# Patient Record
Sex: FEMALE | Race: BLACK OR AFRICAN AMERICAN | NOT HISPANIC OR LATINO | Employment: FULL TIME | RURAL
[De-identification: names, ages, dates, MRNs, and addresses within clinical notes are randomized per-mention and may not be internally consistent; named-entity substitution may affect disease eponyms.]

---

## 2023-06-19 DIAGNOSIS — Z01.818 PREOP TESTING: Primary | ICD-10-CM

## 2023-06-21 ENCOUNTER — HOSPITAL ENCOUNTER (OUTPATIENT)
Facility: HOSPITAL | Age: 58
Discharge: HOME | End: 2023-06-21
Attending: OBSTETRICS & GYNECOLOGY | Admitting: OBSTETRICS & GYNECOLOGY
Payer: COMMERCIAL

## 2023-06-21 ENCOUNTER — ANESTHESIA EVENT (OUTPATIENT)
Dept: SURGERY | Facility: HOSPITAL | Age: 58
End: 2023-06-21
Payer: COMMERCIAL

## 2023-06-21 ENCOUNTER — ANESTHESIA (OUTPATIENT)
Dept: SURGERY | Facility: HOSPITAL | Age: 58
End: 2023-06-21
Payer: COMMERCIAL

## 2023-06-21 VITALS
BODY MASS INDEX: 28.7 KG/M2 | OXYGEN SATURATION: 97 % | HEIGHT: 63 IN | RESPIRATION RATE: 18 BRPM | SYSTOLIC BLOOD PRESSURE: 105 MMHG | WEIGHT: 162 LBS | TEMPERATURE: 98 F | DIASTOLIC BLOOD PRESSURE: 90 MMHG | HEART RATE: 64 BPM

## 2023-06-21 DIAGNOSIS — N87.0 MILD DYSPLASIA OF CERVIX: ICD-10-CM

## 2023-06-21 DIAGNOSIS — R87.810 PAP SMEAR OF CERVIX SHOWS HIGH RISK HPV PRESENT: ICD-10-CM

## 2023-06-21 DIAGNOSIS — Z98.890 STATUS POST CONIZATION OF CERVIX: Primary | ICD-10-CM

## 2023-06-21 PROBLEM — N87.9 CERVICAL DYSPLASIA: Status: ACTIVE | Noted: 2023-06-21

## 2023-06-21 PROCEDURE — 36000706: Performed by: OBSTETRICS & GYNECOLOGY

## 2023-06-21 PROCEDURE — D9220A PRA ANESTHESIA: ICD-10-PCS | Mod: ANES,,, | Performed by: ANESTHESIOLOGY

## 2023-06-21 PROCEDURE — D9220A PRA ANESTHESIA: Mod: CRNA,,, | Performed by: NURSE ANESTHETIST, CERTIFIED REGISTERED

## 2023-06-21 PROCEDURE — 88307 SURGICAL PATHOLOGY: ICD-10-PCS | Mod: 26,,, | Performed by: PATHOLOGY

## 2023-06-21 PROCEDURE — 88307 TISSUE EXAM BY PATHOLOGIST: CPT | Mod: TC,SUR | Performed by: OBSTETRICS & GYNECOLOGY

## 2023-06-21 PROCEDURE — D9220A PRA ANESTHESIA: ICD-10-PCS | Mod: CRNA,,, | Performed by: NURSE ANESTHETIST, CERTIFIED REGISTERED

## 2023-06-21 PROCEDURE — 57520 PR CONIZATION CERVIX,KNIFE/LASER: ICD-10-PCS | Mod: ,,, | Performed by: OBSTETRICS & GYNECOLOGY

## 2023-06-21 PROCEDURE — 88342 SURGICAL PATHOLOGY: ICD-10-PCS | Mod: 26,,, | Performed by: PATHOLOGY

## 2023-06-21 PROCEDURE — 37000008 HC ANESTHESIA 1ST 15 MINUTES: Performed by: OBSTETRICS & GYNECOLOGY

## 2023-06-21 PROCEDURE — 37000009 HC ANESTHESIA EA ADD 15 MINS: Performed by: OBSTETRICS & GYNECOLOGY

## 2023-06-21 PROCEDURE — 88342 IMHCHEM/IMCYTCHM 1ST ANTB: CPT | Mod: 26,,, | Performed by: PATHOLOGY

## 2023-06-21 PROCEDURE — 57520 CONIZATION OF CERVIX: CPT | Mod: ,,, | Performed by: OBSTETRICS & GYNECOLOGY

## 2023-06-21 PROCEDURE — 36000707: Performed by: OBSTETRICS & GYNECOLOGY

## 2023-06-21 PROCEDURE — 71000015 HC POSTOP RECOV 1ST HR: Performed by: OBSTETRICS & GYNECOLOGY

## 2023-06-21 PROCEDURE — 88342 IMHCHEM/IMCYTCHM 1ST ANTB: CPT | Mod: TC,SUR | Performed by: OBSTETRICS & GYNECOLOGY

## 2023-06-21 PROCEDURE — 27000655: Performed by: NURSE ANESTHETIST, CERTIFIED REGISTERED

## 2023-06-21 PROCEDURE — 63600175 PHARM REV CODE 636 W HCPCS: Performed by: NURSE ANESTHETIST, CERTIFIED REGISTERED

## 2023-06-21 PROCEDURE — 71000016 HC POSTOP RECOV ADDL HR: Performed by: OBSTETRICS & GYNECOLOGY

## 2023-06-21 PROCEDURE — 25000003 PHARM REV CODE 250: Performed by: OBSTETRICS & GYNECOLOGY

## 2023-06-21 PROCEDURE — 27000177 HC AIRWAY, LARYNGEAL MASK: Performed by: NURSE ANESTHETIST, CERTIFIED REGISTERED

## 2023-06-21 PROCEDURE — 25000003 PHARM REV CODE 250: Performed by: NURSE ANESTHETIST, CERTIFIED REGISTERED

## 2023-06-21 PROCEDURE — 88307 TISSUE EXAM BY PATHOLOGIST: CPT | Mod: 26,,, | Performed by: PATHOLOGY

## 2023-06-21 PROCEDURE — 71000033 HC RECOVERY, INTIAL HOUR: Performed by: OBSTETRICS & GYNECOLOGY

## 2023-06-21 PROCEDURE — D9220A PRA ANESTHESIA: Mod: ANES,,, | Performed by: ANESTHESIOLOGY

## 2023-06-21 RX ORDER — ONDANSETRON 4 MG/1
8 TABLET, ORALLY DISINTEGRATING ORAL EVERY 8 HOURS PRN
Status: DISCONTINUED | OUTPATIENT
Start: 2023-06-21 | End: 2023-06-21 | Stop reason: HOSPADM

## 2023-06-21 RX ORDER — PROPOFOL 10 MG/ML
VIAL (ML) INTRAVENOUS
Status: DISCONTINUED | OUTPATIENT
Start: 2023-06-21 | End: 2023-06-21

## 2023-06-21 RX ORDER — SODIUM CHLORIDE, SODIUM LACTATE, POTASSIUM CHLORIDE, CALCIUM CHLORIDE 600; 310; 30; 20 MG/100ML; MG/100ML; MG/100ML; MG/100ML
INJECTION, SOLUTION INTRAVENOUS CONTINUOUS PRN
Status: DISCONTINUED | OUTPATIENT
Start: 2023-06-21 | End: 2023-06-21

## 2023-06-21 RX ORDER — HYDROMORPHONE HYDROCHLORIDE 2 MG/ML
0.5 INJECTION, SOLUTION INTRAMUSCULAR; INTRAVENOUS; SUBCUTANEOUS EVERY 5 MIN PRN
Status: DISCONTINUED | OUTPATIENT
Start: 2023-06-21 | End: 2023-06-21 | Stop reason: HOSPADM

## 2023-06-21 RX ORDER — MEPERIDINE HYDROCHLORIDE 25 MG/ML
25 INJECTION INTRAMUSCULAR; INTRAVENOUS; SUBCUTANEOUS ONCE AS NEEDED
Status: DISCONTINUED | OUTPATIENT
Start: 2023-06-21 | End: 2023-06-21 | Stop reason: HOSPADM

## 2023-06-21 RX ORDER — SODIUM CHLORIDE 9 MG/ML
INJECTION, SOLUTION INTRAVENOUS CONTINUOUS
Status: DISCONTINUED | OUTPATIENT
Start: 2023-06-21 | End: 2023-06-21 | Stop reason: HOSPADM

## 2023-06-21 RX ORDER — ONDANSETRON 2 MG/ML
INJECTION INTRAMUSCULAR; INTRAVENOUS
Status: DISCONTINUED | OUTPATIENT
Start: 2023-06-21 | End: 2023-06-21

## 2023-06-21 RX ORDER — DIPHENHYDRAMINE HCL 25 MG
25 CAPSULE ORAL EVERY 4 HOURS PRN
Status: DISCONTINUED | OUTPATIENT
Start: 2023-06-21 | End: 2023-06-21 | Stop reason: HOSPADM

## 2023-06-21 RX ORDER — DIPHENHYDRAMINE HYDROCHLORIDE 50 MG/ML
25 INJECTION INTRAMUSCULAR; INTRAVENOUS EVERY 4 HOURS PRN
Status: DISCONTINUED | OUTPATIENT
Start: 2023-06-21 | End: 2023-06-21 | Stop reason: HOSPADM

## 2023-06-21 RX ORDER — MORPHINE SULFATE 10 MG/ML
4 INJECTION INTRAMUSCULAR; INTRAVENOUS; SUBCUTANEOUS EVERY 5 MIN PRN
Status: DISCONTINUED | OUTPATIENT
Start: 2023-06-21 | End: 2023-06-21 | Stop reason: HOSPADM

## 2023-06-21 RX ORDER — KETOROLAC TROMETHAMINE 30 MG/ML
INJECTION, SOLUTION INTRAMUSCULAR; INTRAVENOUS
Status: DISCONTINUED | OUTPATIENT
Start: 2023-06-21 | End: 2023-06-21

## 2023-06-21 RX ORDER — LIDOCAINE HYDROCHLORIDE 20 MG/ML
INJECTION, SOLUTION EPIDURAL; INFILTRATION; INTRACAUDAL; PERINEURAL
Status: DISCONTINUED | OUTPATIENT
Start: 2023-06-21 | End: 2023-06-21

## 2023-06-21 RX ORDER — FENTANYL CITRATE 50 UG/ML
INJECTION, SOLUTION INTRAMUSCULAR; INTRAVENOUS
Status: DISCONTINUED | OUTPATIENT
Start: 2023-06-21 | End: 2023-06-21

## 2023-06-21 RX ORDER — MIDAZOLAM HYDROCHLORIDE 1 MG/ML
INJECTION INTRAMUSCULAR; INTRAVENOUS
Status: DISCONTINUED | OUTPATIENT
Start: 2023-06-21 | End: 2023-06-21

## 2023-06-21 RX ORDER — HYDROCODONE BITARTRATE AND ACETAMINOPHEN 5; 325 MG/1; MG/1
1 TABLET ORAL EVERY 4 HOURS PRN
Status: DISCONTINUED | OUTPATIENT
Start: 2023-06-21 | End: 2023-06-21 | Stop reason: HOSPADM

## 2023-06-21 RX ORDER — HYDROCODONE BITARTRATE AND ACETAMINOPHEN 7.5; 325 MG/1; MG/1
1 TABLET ORAL EVERY 6 HOURS PRN
Qty: 4 TABLET | Refills: 0 | Status: SHIPPED | OUTPATIENT
Start: 2023-06-21

## 2023-06-21 RX ORDER — SODIUM CHLORIDE, SODIUM LACTATE, POTASSIUM CHLORIDE, CALCIUM CHLORIDE 600; 310; 30; 20 MG/100ML; MG/100ML; MG/100ML; MG/100ML
INJECTION, SOLUTION INTRAVENOUS CONTINUOUS
Status: DISCONTINUED | OUTPATIENT
Start: 2023-06-21 | End: 2023-06-21 | Stop reason: HOSPADM

## 2023-06-21 RX ORDER — DIPHENHYDRAMINE HYDROCHLORIDE 50 MG/ML
25 INJECTION INTRAMUSCULAR; INTRAVENOUS EVERY 6 HOURS PRN
Status: DISCONTINUED | OUTPATIENT
Start: 2023-06-21 | End: 2023-06-21 | Stop reason: HOSPADM

## 2023-06-21 RX ORDER — ONDANSETRON 2 MG/ML
4 INJECTION INTRAMUSCULAR; INTRAVENOUS DAILY PRN
Status: DISCONTINUED | OUTPATIENT
Start: 2023-06-21 | End: 2023-06-21 | Stop reason: HOSPADM

## 2023-06-21 RX ORDER — DEXAMETHASONE SODIUM PHOSPHATE 100 MG/10ML
INJECTION INTRAMUSCULAR; INTRAVENOUS
Status: DISCONTINUED | OUTPATIENT
Start: 2023-06-21 | End: 2023-06-21

## 2023-06-21 RX ORDER — PROCHLORPERAZINE EDISYLATE 5 MG/ML
5 INJECTION INTRAMUSCULAR; INTRAVENOUS EVERY 6 HOURS PRN
Status: DISCONTINUED | OUTPATIENT
Start: 2023-06-21 | End: 2023-06-21 | Stop reason: HOSPADM

## 2023-06-21 RX ORDER — CEFAZOLIN SODIUM 1 G/3ML
INJECTION, POWDER, FOR SOLUTION INTRAMUSCULAR; INTRAVENOUS
Status: DISCONTINUED | OUTPATIENT
Start: 2023-06-21 | End: 2023-06-21

## 2023-06-21 RX ORDER — SODIUM CHLORIDE 9 MG/ML
INJECTION, SOLUTION INTRAVENOUS CONTINUOUS PRN
Status: DISCONTINUED | OUTPATIENT
Start: 2023-06-21 | End: 2023-06-21

## 2023-06-21 RX ADMIN — MIDAZOLAM HYDROCHLORIDE 2 MG: 1 INJECTION, SOLUTION INTRAMUSCULAR; INTRAVENOUS at 01:06

## 2023-06-21 RX ADMIN — CEFAZOLIN 2000 MG: 1 INJECTION, POWDER, FOR SOLUTION INTRAMUSCULAR; INTRAVENOUS; PARENTERAL at 01:06

## 2023-06-21 RX ADMIN — DEXAMETHASONE SODIUM PHOSPHATE 8 MG: 10 INJECTION INTRAMUSCULAR; INTRAVENOUS at 01:06

## 2023-06-21 RX ADMIN — SODIUM CHLORIDE, POTASSIUM CHLORIDE, SODIUM LACTATE AND CALCIUM CHLORIDE: 600; 310; 30; 20 INJECTION, SOLUTION INTRAVENOUS at 01:06

## 2023-06-21 RX ADMIN — LIDOCAINE HYDROCHLORIDE 50 MG: 20 INJECTION, SOLUTION INTRAVENOUS at 01:06

## 2023-06-21 RX ADMIN — FENTANYL CITRATE 100 MCG: 50 INJECTION INTRAMUSCULAR; INTRAVENOUS at 01:06

## 2023-06-21 RX ADMIN — SODIUM CHLORIDE: 9 INJECTION, SOLUTION INTRAVENOUS at 08:06

## 2023-06-21 RX ADMIN — HYDROCODONE BITARTRATE AND ACETAMINOPHEN 1 TABLET: 5; 325 TABLET ORAL at 03:06

## 2023-06-21 RX ADMIN — KETOROLAC TROMETHAMINE 30 MG: 30 INJECTION, SOLUTION INTRAMUSCULAR at 02:06

## 2023-06-21 RX ADMIN — SODIUM CHLORIDE: 9 INJECTION, SOLUTION INTRAVENOUS at 01:06

## 2023-06-21 RX ADMIN — PROPOFOL 200 MG: 10 INJECTION, EMULSION INTRAVENOUS at 01:06

## 2023-06-21 RX ADMIN — ONDANSETRON 4 MG: 2 INJECTION INTRAMUSCULAR; INTRAVENOUS at 01:06

## 2023-06-21 NOTE — H&P
"History & Physical    SUBJECTIVE:     History of Present Illness:  Patient is a 57 y.o. female presents for [facility] secondary to cervical dysplasia  Chief Complaint   Patient presents with    Cervical Dysplasia       Review of patient's allergies indicates:  No Known Allergies    Current Facility-Administered Medications   Medication Dose Route Frequency Provider Last Rate Last Admin    0.9%  NaCl infusion   Intravenous Continuous Urelaine MD Berenice           No past medical history on file.  Past Surgical History:   Procedure Laterality Date     SECTION       No family history on file.        Review of Systems:  Review of Systems   Constitutional:  Negative for appetite change, chills, fatigue and fever.   HENT: Negative.     Eyes: Negative.    Respiratory:  Negative for cough, chest tightness and shortness of breath.    Cardiovascular:  Negative for chest pain, palpitations and leg swelling.   Gastrointestinal:  Negative for abdominal distention, abdominal pain, blood in stool, constipation, diarrhea, nausea and vomiting.   Endocrine: Negative for cold intolerance, heat intolerance, polydipsia, polyphagia and polyuria.   Genitourinary:  Negative for difficulty urinating, dyspareunia, dysuria, flank pain, frequency, pelvic pain, urgency, vaginal bleeding, vaginal discharge and vaginal pain.   Musculoskeletal: Negative.    Skin: Negative.    Neurological: Negative.    Psychiatric/Behavioral: Negative.  Negative for agitation, behavioral problems, confusion and sleep disturbance. The patient is not nervous/anxious.      OBJECTIVE:     Vital Signs (Most Recent)  Temp: 97.9 °F (36.6 °C) (23 08)  Pulse: 61 (23 08)  Resp: 16 (23 08)  BP: 125/78 (23 08)  SpO2: 100 % (23)  5' 3" (1.6 m)  73.5 kg (162 lb)     Physical Exam:  Physical Exam  Vitals reviewed. Exam conducted with a chaperone present.   Constitutional:       Appearance: Normal appearance.   HENT:      Head: " Normocephalic and atraumatic.      Mouth/Throat:      Mouth: Mucous membranes are moist.   Eyes:      Extraocular Movements: Extraocular movements intact.      Pupils: Pupils are equal, round, and reactive to light.   Cardiovascular:      Rate and Rhythm: Normal rate and regular rhythm.      Pulses: Normal pulses.      Heart sounds: Normal heart sounds.   Pulmonary:      Effort: Pulmonary effort is normal.      Breath sounds: Normal breath sounds.   Abdominal:      General: Abdomen is flat. Bowel sounds are normal.      Palpations: Abdomen is soft.   Musculoskeletal:         General: Normal range of motion.      Cervical back: Normal range of motion.   Skin:     General: Skin is warm and dry.   Neurological:      General: No focal deficit present.      Mental Status: She is alert and oriented to person, place, and time.   Psychiatric:         Mood and Affect: Mood normal.         Behavior: Behavior normal.         Thought Content: Thought content normal.         Judgment: Judgment normal.       Laboratory  Lab Visit on 06/19/2023   Component Date Value Ref Range Status    Sodium 06/19/2023 143  136 - 145 mmol/L Final    Potassium 06/19/2023 4.2  3.5 - 5.1 mmol/L Final    Chloride 06/19/2023 113 (H)  98 - 107 mmol/L Final    CO2 06/19/2023 27  21 - 32 mmol/L Final    Anion Gap 06/19/2023 7  7 - 16 mmol/L Final    Glucose 06/19/2023 97  74 - 106 mg/dL Final    BUN 06/19/2023 19 (H)  7 - 18 mg/dL Final    Creatinine 06/19/2023 0.75  0.55 - 1.02 mg/dL Final    BUN/Creatinine Ratio 06/19/2023 25 (H)  6 - 20 Final    Calcium 06/19/2023 9.7  8.5 - 10.1 mg/dL Final    Total Protein 06/19/2023 6.9  6.4 - 8.2 g/dL Final    Albumin 06/19/2023 3.7  3.5 - 5.0 g/dL Final    Globulin 06/19/2023 3.2  2.0 - 4.0 g/dL Final    A/G Ratio 06/19/2023 1.2   Final    Bilirubin, Total 06/19/2023 0.2  >0.0 - 1.2 mg/dL Final    Alk Phos 06/19/2023 85  46 - 118 U/L Final    ALT 06/19/2023 21  13 - 56 U/L Final    AST 06/19/2023 16  15 - 37 U/L  Final    eGFR 06/19/2023 94  >=60 mL/min/1.73m2 Final    Specimen Outdate 06/19/2023 06/22/2023 23:59   Final    Group & Rh 06/19/2023 O POS   Final    Indirect Nav 06/19/2023 POS (A)   Final    WBC 06/19/2023 5.33  4.50 - 11.00 K/uL Final    RBC 06/19/2023 4.23  4.20 - 5.40 M/uL Final    Hemoglobin 06/19/2023 12.6  12.0 - 16.0 g/dL Final    Hematocrit 06/19/2023 40.2  38.0 - 47.0 % Final    MCV 06/19/2023 95.0  80.0 - 96.0 fL Final    MCH 06/19/2023 29.8  27.0 - 31.0 pg Final    MCHC 06/19/2023 31.3 (L)  32.0 - 36.0 g/dL Final    RDW 06/19/2023 12.8  11.5 - 14.5 % Final    Platelet Count 06/19/2023 190  150 - 400 K/uL Final    MPV 06/19/2023 12.0  9.4 - 12.4 fL Final    Neutrophils % 06/19/2023 40.7 (L)  53.0 - 65.0 % Final    Lymphocytes % 06/19/2023 47.7 (H)  27.0 - 41.0 % Final    Monocytes % 06/19/2023 7.5 (H)  2.0 - 6.0 % Final    Eosinophils % 06/19/2023 3.0  1.0 - 4.0 % Final    Basophils % 06/19/2023 0.9  0.0 - 1.0 % Final    Immature Granulocytes % 06/19/2023 0.2  0.0 - 0.4 % Final    nRBC, Auto 06/19/2023 0.0  <=0.0 % Final    Neutrophils, Abs 06/19/2023 2.17  1.80 - 7.70 K/uL Final    Lymphocytes, Absolute 06/19/2023 2.54  1.00 - 4.80 K/uL Final    Monocytes, Absolute 06/19/2023 0.40  0.00 - 0.80 K/uL Final    Eosinophils, Absolute 06/19/2023 0.16  0.00 - 0.50 K/uL Final    Basophils, Absolute 06/19/2023 0.05  0.00 - 0.20 K/uL Final    Immature Granulocytes, Absolute 06/19/2023 0.01  0.00 - 0.04 K/uL Final    nRBC, Absolute 06/19/2023 0.00  <=0.00 x10e3/uL Final    Diff Type 06/19/2023 Auto   Final    Antibody Identification 06/19/2023 POS, Anti-E   Final   Lab Visit on 06/19/2023   Component Date Value Ref Range Status    HCG Qualitative, Urine 06/19/2023 Negative  Negative Final           ASSESSMENT/PLAN:   [email or web address]@  Active Diagnoses:    Diagnosis Date Noted POA    Cervical dysplasia [N87.9] 06/21/2023 Unknown      Problems Resolved During this Admission:       PLAN:Plan   Pt scheduled for [facility]  at 6/21/2023  Risks, benefits and alternatives to the procedure reviewed with the pt  After discussion of the risk, alternatives, benefits, and indication of surgery, as well as the risk of surgery, questions were sought and answered and informed consent obtained to proceed with surgery. We discussed the risk of the procedures to include, but not be limited to, 1) bleeding, which could be possibly excessive, potentially requiring a blood transfusion and subsequent risk of hepatitis (1 in 300,000), transfusion reaction (<1%), and HIV (1 in 1,000,000); 2) injury to internal organs including the bowel, bladder, great vessels, nerves, and ureters, potentially requiring further surgery at that time or at a later date; 3) infection requiring a prolonged hospital stay and antibiotics with possible drainage of an abscess or wound breakdown; 4) anesthetic complications; 5) deep venous thrombosis and the risk of pulmonary emboli; 6) pneumonia; and 7) infertility and/or menopause, 8) possible death. All questions were answered and a consent form was signed. She stated she understood the above risks and desired to proceed.   All questions answered to the level of the patient's satisfaction.   Case request and orders done.   Written materials provided  Consent obtained.
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
## 2023-06-21 NOTE — OP NOTE
DATE OF PROCEDURE: 06/21/2023    PREOPERATIVE DIAGNOSES:   1. Cervical dysplasia         POSTOPERATIVE DIAGNOSES:   1. same      PROCEDURE: [facility]    SURGEON: Rosette Ng MD    ASSISTANT: none    ANESTHESIA: general    ESTIMATED BLOOD LOSS: 25 mL.     COMPLICATIONS: None.     FINDINGS: pathology pending    PROCEDURE IN DETAIL:   Following informed consent the patient was taken to the operating room where general anesthesia was administered difficulty. She is prepped and draped in usual sterile fashion placed in dorsal lithotomy position. A weighted speculum was placed into the vagina and the anterior lip of cervix grasped with single-tooth tenaculum. Stay sutures were placed at the 3 and 9:00 position with 2-0 chromic. The cervix was then painted with Lugol solution dysplastic area identified. Pitressin was then injected circumferentially as the patient tolerated well. Using a right angle scapel the dysplastic area was excised as a conelike fashion. The specimen was tagged at 12:00 position for pathological purposes. The cervical bed was then cauterized with the Bovie with hemostasis found be adequate. Using a 2-0 chromic running interlocking suture was placed cervix for added support. After which Monsel solution was applied for the last bit of hemostasis. At this procedure all sponge lap needle counts correct ×2. Patient taken cover awake and stable condition.
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
## 2023-06-21 NOTE — ANESTHESIA PROCEDURE NOTES
Intubation    Date/Time: 6/21/2023 1:21 PM  Performed by: Baudilio Jeffries CRNA  Authorized by: Kristian Davison DO     Intubation:     Induction:  Intravenous    Intubated:  Postinduction    Mask Ventilation:  Easy mask    Attempts:  1    Attempted By:  CRNA    Difficult Airway Encountered?: No      Complications:  None    Airway Device:  Supraglottic airway/LMA    Airway Device Size:  4.0    Placement Verified By:  Capnometry    Complicating Factors:  None    Findings Post-Intubation:  BS equal bilateral

## 2023-06-21 NOTE — ANESTHESIA PREPROCEDURE EVALUATION
06/21/2023  Laura Reynoso is a 57 y.o., female.      Pre-op Assessment    I have reviewed the Patient Summary Reports.     I have reviewed the Nursing Notes. I have reviewed the NPO Status.   I have reviewed the Medications.     Review of Systems  Anesthesia Hx:  No problems with previous Anesthesia  Denies Family Hx of Anesthesia complications.   Denies Personal Hx of Anesthesia complications.   Social:  Non-Smoker, No Alcohol Use    Cardiovascular:   Exercise tolerance: good    Hepatic/GI:   GERD, well controlled    OB/GYN/PEDS:  Cervical dysplasia       Physical Exam  General: Well nourished, Cooperative and Alert    Airway:  Mallampati: II   Mouth Opening: Normal  TM Distance: Normal  Tongue: Normal  Neck ROM: Normal ROM    Dental:  Intact    Chest/Lungs:  Clear to auscultation, Normal Respiratory Rate    Heart:  Rate: Normal  Rhythm: Regular Rhythm        Chemistry        Component Value Date/Time     06/19/2023 1058    K 4.2 06/19/2023 1058     (H) 06/19/2023 1058    CO2 27 06/19/2023 1058    BUN 19 (H) 06/19/2023 1058    CREATININE 0.75 06/19/2023 1058    GLU 97 06/19/2023 1058        Component Value Date/Time    CALCIUM 9.7 06/19/2023 1058    ALKPHOS 85 06/19/2023 1058    AST 16 06/19/2023 1058    ALT 21 06/19/2023 1058    BILITOT 0.2 06/19/2023 1058        Lab Results   Component Value Date    WBC 5.33 06/19/2023    HGB 12.6 06/19/2023    HCT 40.2 06/19/2023     06/19/2023     No results found for this or any previous visit.      Anesthesia Plan  Type of Anesthesia, risks & benefits discussed:    Anesthesia Type: Gen Supraglottic Airway  Intra-op Monitoring Plan: Standard ASA Monitors  Post Op Pain Control Plan: multimodal analgesia  Induction:  IV  Airway Plan: Direct, Post-Induction  Informed Consent: Informed consent signed with the Patient and all parties understand the risks  and agree with anesthesia plan.  All questions answered.   ASA Score: 2  Day of Surgery Review of History & Physical: H&P Update referred to the surgeon/provider.I have interviewed and examined the patient. I have reviewed the patient's H&P dated: There are no significant changes.     Ready For Surgery From Anesthesia Perspective.     .

## 2023-06-23 LAB
DHEA SERPL-MCNC: NORMAL
ESTROGEN SERPL-MCNC: NORMAL PG/ML
INSULIN SERPL-ACNC: NORMAL U[IU]/ML
LAB AP GROSS DESCRIPTION: NORMAL
LAB AP LABORATORY NOTES: NORMAL
T3RU NFR SERPL: NORMAL %
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
## 2024-02-23 ENCOUNTER — OFFICE VISIT (OUTPATIENT)
Dept: FAMILY MEDICINE | Facility: CLINIC | Age: 59
End: 2024-02-23
Payer: COMMERCIAL

## 2024-02-23 VITALS
SYSTOLIC BLOOD PRESSURE: 143 MMHG | WEIGHT: 170 LBS | HEIGHT: 65 IN | DIASTOLIC BLOOD PRESSURE: 82 MMHG | HEART RATE: 77 BPM | BODY MASS INDEX: 28.32 KG/M2 | TEMPERATURE: 98 F | RESPIRATION RATE: 20 BRPM | OXYGEN SATURATION: 100 %

## 2024-02-23 DIAGNOSIS — M62.838 MUSCLE SPASM: Primary | ICD-10-CM

## 2024-02-23 PROCEDURE — 1159F MED LIST DOCD IN RCRD: CPT | Mod: CPTII,,, | Performed by: FAMILY MEDICINE

## 2024-02-23 PROCEDURE — 3008F BODY MASS INDEX DOCD: CPT | Mod: CPTII,,, | Performed by: FAMILY MEDICINE

## 2024-02-23 PROCEDURE — 3077F SYST BP >= 140 MM HG: CPT | Mod: CPTII,,, | Performed by: FAMILY MEDICINE

## 2024-02-23 PROCEDURE — 96372 THER/PROPH/DIAG INJ SC/IM: CPT | Mod: ,,, | Performed by: FAMILY MEDICINE

## 2024-02-23 PROCEDURE — 3079F DIAST BP 80-89 MM HG: CPT | Mod: CPTII,,, | Performed by: FAMILY MEDICINE

## 2024-02-23 PROCEDURE — 99203 OFFICE O/P NEW LOW 30 MIN: CPT | Mod: 25,,, | Performed by: FAMILY MEDICINE

## 2024-02-23 RX ORDER — POTASSIUM CHLORIDE 20 MEQ/1
20 TABLET, EXTENDED RELEASE ORAL
COMMUNITY

## 2024-02-23 RX ORDER — IBUPROFEN 200 MG
CAPSULE ORAL
COMMUNITY

## 2024-02-23 RX ORDER — DEXAMETHASONE SODIUM PHOSPHATE 4 MG/ML
6 INJECTION, SOLUTION INTRA-ARTICULAR; INTRALESIONAL; INTRAMUSCULAR; INTRAVENOUS; SOFT TISSUE
Status: COMPLETED | OUTPATIENT
Start: 2024-02-23 | End: 2024-02-23

## 2024-02-23 RX ORDER — PROMETHAZINE HYDROCHLORIDE 6.25 MG/5ML
10 SYRUP ORAL EVERY 6 HOURS PRN
COMMUNITY
Start: 2023-09-06

## 2024-02-23 RX ORDER — TIZANIDINE 4 MG/1
TABLET ORAL
Qty: 60 TABLET | Refills: 2 | Status: SHIPPED | OUTPATIENT
Start: 2024-02-23

## 2024-02-23 RX ORDER — CYCLOBENZAPRINE HCL 10 MG
10 TABLET ORAL NIGHTLY PRN
COMMUNITY
Start: 2024-01-23 | End: 2024-02-23

## 2024-02-23 RX ORDER — PREDNISONE 20 MG/1
TABLET ORAL
Qty: 10 TABLET | Refills: 0 | Status: SHIPPED | OUTPATIENT
Start: 2024-02-23 | End: 2024-05-03

## 2024-02-23 RX ADMIN — DEXAMETHASONE SODIUM PHOSPHATE 6 MG: 4 INJECTION, SOLUTION INTRA-ARTICULAR; INTRALESIONAL; INTRAMUSCULAR; INTRAVENOUS; SOFT TISSUE at 11:02
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
## 2024-03-15 ENCOUNTER — OFFICE VISIT (OUTPATIENT)
Dept: FAMILY MEDICINE | Facility: CLINIC | Age: 59
End: 2024-03-15
Payer: COMMERCIAL

## 2024-03-15 VITALS
BODY MASS INDEX: 30.3 KG/M2 | HEIGHT: 63 IN | RESPIRATION RATE: 17 BRPM | DIASTOLIC BLOOD PRESSURE: 89 MMHG | TEMPERATURE: 99 F | WEIGHT: 171 LBS | SYSTOLIC BLOOD PRESSURE: 151 MMHG | OXYGEN SATURATION: 99 % | HEART RATE: 68 BPM

## 2024-03-15 DIAGNOSIS — M54.2 NECK PAIN: ICD-10-CM

## 2024-03-15 DIAGNOSIS — M62.838 MUSCLE SPASM: ICD-10-CM

## 2024-03-15 DIAGNOSIS — M17.11 OSTEOARTHRITIS OF RIGHT KNEE, UNSPECIFIED OSTEOARTHRITIS TYPE: Primary | ICD-10-CM

## 2024-03-15 PROCEDURE — 99214 OFFICE O/P EST MOD 30 MIN: CPT | Mod: 25,,, | Performed by: FAMILY MEDICINE

## 2024-03-15 PROCEDURE — 96372 THER/PROPH/DIAG INJ SC/IM: CPT | Mod: ,,, | Performed by: FAMILY MEDICINE

## 2024-03-15 PROCEDURE — 3079F DIAST BP 80-89 MM HG: CPT | Mod: CPTII,,, | Performed by: FAMILY MEDICINE

## 2024-03-15 PROCEDURE — 3008F BODY MASS INDEX DOCD: CPT | Mod: CPTII,,, | Performed by: FAMILY MEDICINE

## 2024-03-15 PROCEDURE — 1159F MED LIST DOCD IN RCRD: CPT | Mod: CPTII,,, | Performed by: FAMILY MEDICINE

## 2024-03-15 PROCEDURE — 1160F RVW MEDS BY RX/DR IN RCRD: CPT | Mod: CPTII,,, | Performed by: FAMILY MEDICINE

## 2024-03-15 PROCEDURE — 3077F SYST BP >= 140 MM HG: CPT | Mod: CPTII,,, | Performed by: FAMILY MEDICINE

## 2024-03-15 RX ORDER — PREDNISONE 20 MG/1
40 TABLET ORAL DAILY
Qty: 10 TABLET | Refills: 0 | Status: SHIPPED | OUTPATIENT
Start: 2024-03-15 | End: 2024-03-20

## 2024-03-15 RX ORDER — DEXAMETHASONE SODIUM PHOSPHATE 4 MG/ML
4 INJECTION, SOLUTION INTRA-ARTICULAR; INTRALESIONAL; INTRAMUSCULAR; INTRAVENOUS; SOFT TISSUE
Status: COMPLETED | OUTPATIENT
Start: 2024-03-15 | End: 2024-03-15

## 2024-03-15 RX ADMIN — DEXAMETHASONE SODIUM PHOSPHATE 4 MG: 4 INJECTION, SOLUTION INTRA-ARTICULAR; INTRALESIONAL; INTRAMUSCULAR; INTRAVENOUS; SOFT TISSUE at 11:03

## 2024-03-15 NOTE — PROGRESS NOTES
Subjective:       Patient ID: Laura Reynoso is a 58 y.o. female.    Chief Complaint: Back Pain (Lower back for a wk. ), Shoulder Pain (Right shoulder pain for 3 wks. ), and Knee Pain (Right knee pain for 3 wks. )    Back Pain  Associated symptoms include leg pain. Pertinent negatives include no abdominal pain, bladder incontinence, chest pain, dysuria, fever, headaches, numbness, pelvic pain or weakness.   Shoulder Pain   Pertinent negatives include no fever, headaches or numbness.   Knee Pain   Pertinent negatives include no numbness.     Review of Systems   Constitutional:  Negative for activity change, appetite change, chills, diaphoresis, fatigue, fever and unexpected weight change.   HENT:  Negative for nasal congestion, dental problem, drooling, ear discharge, ear pain, facial swelling, hearing loss, mouth sores, nosebleeds, postnasal drip, rhinorrhea, sinus pressure/congestion, sneezing, sore throat, tinnitus, trouble swallowing, voice change and goiter.    Eyes:  Negative for photophobia, discharge, itching and visual disturbance.   Respiratory:  Negative for apnea, cough, choking, chest tightness, shortness of breath, wheezing and stridor.    Cardiovascular:  Negative for chest pain, palpitations, leg swelling and claudication.   Gastrointestinal:  Negative for abdominal distention, abdominal pain, anal bleeding, blood in stool, change in bowel habit, constipation, diarrhea, nausea and vomiting.   Endocrine: Negative for cold intolerance, heat intolerance, polydipsia, polyphagia and polyuria.   Genitourinary:  Negative for bladder incontinence, decreased urine volume, difficulty urinating, dysuria, enuresis, flank pain, frequency, hematuria, nocturia, pelvic pain and urgency.   Musculoskeletal:  Positive for arthralgias, back pain, leg pain and myalgias. Negative for gait problem, joint swelling, neck pain, neck stiffness and joint deformity.   Integumentary:  Negative for pallor, rash, wound, breast mass  and breast tenderness.   Allergic/Immunologic: Negative for environmental allergies, food allergies and immunocompromised state.   Neurological:  Negative for dizziness, vertigo, tremors, seizures, syncope, facial asymmetry, speech difficulty, weakness, light-headedness, numbness, headaches, memory loss and coordination difficulties.   Hematological:  Negative for adenopathy. Does not bruise/bleed easily.   Psychiatric/Behavioral:  Negative for agitation, behavioral problems, confusion, decreased concentration, dysphoric mood, hallucinations, self-injury, sleep disturbance and suicidal ideas. The patient is not nervous/anxious and is not hyperactive.    Breast: Negative for mass and tenderness        Objective:      Physical Exam  Vitals reviewed.   Constitutional:       Appearance: Normal appearance.   HENT:      Head: Normocephalic and atraumatic.      Right Ear: Tympanic membrane, ear canal and external ear normal.      Left Ear: Tympanic membrane, ear canal and external ear normal.      Nose: Nose normal.      Mouth/Throat:      Mouth: Mucous membranes are moist.      Pharynx: Oropharynx is clear.   Eyes:      Extraocular Movements: Extraocular movements intact.      Conjunctiva/sclera: Conjunctivae normal.      Pupils: Pupils are equal, round, and reactive to light.   Cardiovascular:      Rate and Rhythm: Normal rate and regular rhythm.      Pulses: Normal pulses.      Heart sounds: Normal heart sounds.   Pulmonary:      Effort: Pulmonary effort is normal.      Breath sounds: Normal breath sounds.   Abdominal:      General: Bowel sounds are normal.      Palpations: Abdomen is soft.   Musculoskeletal:         General: Tenderness present. Normal range of motion.      Cervical back: Normal range of motion and neck supple.   Skin:     General: Skin is warm and dry.   Neurological:      General: No focal deficit present.      Mental Status: She is alert. Mental status is at baseline.   Psychiatric:         Mood and  Affect: Mood normal.         Behavior: Behavior normal.         Thought Content: Thought content normal.         Judgment: Judgment normal.         Assessment:       1. Osteoarthritis of right knee, unspecified osteoarthritis type    2. Muscle spasm    3. Neck pain        Plan:     Osteoarthritis of right knee, unspecified osteoarthritis type  -     Ambulatory referral/consult to Orthopedics; Future; Expected date: 03/22/2024    Muscle spasm  -     dexAMETHasone injection 4 mg  -     predniSONE (DELTASONE) 20 MG tablet; Take 2 tablets (40 mg total) by mouth once daily. for 5 days  Dispense: 10 tablet; Refill: 0    Neck pain         Will setup with ortho due to chronic right knee pain. Will treat for muscle spasm and neck pain as well.

## 2024-05-03 ENCOUNTER — OFFICE VISIT (OUTPATIENT)
Dept: FAMILY MEDICINE | Facility: CLINIC | Age: 59
End: 2024-05-03
Payer: COMMERCIAL

## 2024-05-03 VITALS
WEIGHT: 172 LBS | TEMPERATURE: 97 F | HEART RATE: 66 BPM | RESPIRATION RATE: 16 BRPM | DIASTOLIC BLOOD PRESSURE: 90 MMHG | OXYGEN SATURATION: 99 % | SYSTOLIC BLOOD PRESSURE: 144 MMHG | BODY MASS INDEX: 30.48 KG/M2 | HEIGHT: 63 IN

## 2024-05-03 DIAGNOSIS — M25.561 CHRONIC PAIN OF RIGHT KNEE: Primary | ICD-10-CM

## 2024-05-03 DIAGNOSIS — G89.29 CHRONIC PAIN OF RIGHT KNEE: Primary | ICD-10-CM

## 2024-05-03 PROBLEM — M17.9 OSTEOARTHRITIS OF KNEE: Status: ACTIVE | Noted: 2024-05-03

## 2024-05-03 PROBLEM — E04.9 NON-TOXIC GOITER: Status: ACTIVE | Noted: 2024-05-03

## 2024-05-03 PROBLEM — E66.9 OBESITY WITH BODY MASS INDEX 30 OR GREATER: Status: ACTIVE | Noted: 2024-05-03

## 2024-05-03 PROBLEM — R87.811 VAGINAL HIGH RISK HUMAN PAPILLOMAVIRUS (HPV) DNA TEST POSITIVE: Status: ACTIVE | Noted: 2024-05-03

## 2024-05-03 PROBLEM — E83.52 HYPERCALCEMIA: Status: ACTIVE | Noted: 2024-05-03

## 2024-05-03 PROBLEM — N87.0 MILD CERVICAL DYSPLASIA: Status: ACTIVE | Noted: 2024-05-03

## 2024-05-03 PROBLEM — E66.09 OBESITY DUE TO EXCESS CALORIES: Status: ACTIVE | Noted: 2024-05-03

## 2024-05-03 PROBLEM — R13.10 DYSPHAGIA: Status: ACTIVE | Noted: 2024-05-03

## 2024-05-03 PROCEDURE — 1160F RVW MEDS BY RX/DR IN RCRD: CPT | Mod: CPTII,,, | Performed by: NURSE PRACTITIONER

## 2024-05-03 PROCEDURE — 99213 OFFICE O/P EST LOW 20 MIN: CPT | Mod: ,,, | Performed by: NURSE PRACTITIONER

## 2024-05-03 PROCEDURE — 1159F MED LIST DOCD IN RCRD: CPT | Mod: CPTII,,, | Performed by: NURSE PRACTITIONER

## 2024-05-03 PROCEDURE — 3077F SYST BP >= 140 MM HG: CPT | Mod: CPTII,,, | Performed by: NURSE PRACTITIONER

## 2024-05-03 PROCEDURE — 3008F BODY MASS INDEX DOCD: CPT | Mod: CPTII,,, | Performed by: NURSE PRACTITIONER

## 2024-05-03 PROCEDURE — 3080F DIAST BP >= 90 MM HG: CPT | Mod: CPTII,,, | Performed by: NURSE PRACTITIONER

## 2024-05-03 RX ORDER — QUINIDINE GLUCONATE 324 MG
TABLET, EXTENDED RELEASE ORAL
COMMUNITY

## 2024-05-03 RX ORDER — PREDNISONE 10 MG/1
10 TABLET ORAL DAILY
Qty: 7 TABLET | Refills: 0 | Status: SHIPPED | OUTPATIENT
Start: 2024-05-03
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
## 2024-05-06 ENCOUNTER — OFFICE VISIT (OUTPATIENT)
Dept: ORTHOPEDICS | Facility: CLINIC | Age: 59
End: 2024-05-06
Payer: COMMERCIAL

## 2024-05-06 VITALS — WEIGHT: 172 LBS | HEIGHT: 63 IN | BODY MASS INDEX: 30.48 KG/M2

## 2024-05-06 DIAGNOSIS — G89.29 CHRONIC PAIN OF RIGHT KNEE: ICD-10-CM

## 2024-05-06 DIAGNOSIS — M17.11 PRIMARY OSTEOARTHRITIS OF RIGHT KNEE: Primary | ICD-10-CM

## 2024-05-06 DIAGNOSIS — M25.561 CHRONIC PAIN OF RIGHT KNEE: ICD-10-CM

## 2024-05-06 PROCEDURE — 99214 OFFICE O/P EST MOD 30 MIN: CPT | Mod: PBBFAC

## 2024-05-06 PROCEDURE — 1159F MED LIST DOCD IN RCRD: CPT | Mod: CPTII,,,

## 2024-05-06 PROCEDURE — 99203 OFFICE O/P NEW LOW 30 MIN: CPT | Mod: S$PBB,,,

## 2024-05-06 PROCEDURE — 3008F BODY MASS INDEX DOCD: CPT | Mod: CPTII,,,

## 2024-05-08 NOTE — PROGRESS NOTES
CC:   Chief Complaint   Patient presents with    Right Knee - Pain          Laura Reynoso is a 58 y.o. female seen today for Pain of the Right Knee  She is here today for evaluation of right knee pain.  Patient states she has had history of night right knee pain that has now worsened over the past 2 months.  She denies any injury or fall of the onset of her symptoms.  She reports constant pain in the right knee.  She reports pain with weight-bearing.  She reports right knee pain at night.  Patient saw family Medicine on 03/15/2024 and again on 2024, x-rays showed tricompartmental osteoarthritis and she was referred to Orthopedic Clinic for further evaluation.  Patient states she has some relief with Voltaren gel and icy hot.  No other complaints today.      PAST MEDICAL HISTORY: History reviewed. No pertinent past medical history.       PAST SURGICAL HISTORY:   Past Surgical History:   Procedure Laterality Date     SECTION      COLD KNIFE CONIZATION OF CERVIX N/A 2023    Procedure: CONE BIOPSY, CERVIX, USING COLD KNIFE;  Surgeon: Rosette Ng MD;  Location: [facility], [place];  Service: OB/GYN;  Laterality: N/A;          ALLERGIES:   Review of patient's allergies indicates:   Allergen Reactions    Nsaids (non-steroidal anti-inflammatory drug)      Other Reaction(s): Gastric Ulcer        MEDICATIONS :    Current Outpatient Medications:     calcium carbonate (CALCIUM 500 ORAL), Calcium 500, Disp: , Rfl:     calcium carbonate-vitamin D3 500 mg-3.125 mcg (125 unit) per tablet, Take by mouth., Disp: , Rfl:     ferrous gluconate (FERGON) 240 (27 FE) MG tablet, Take by mouth., Disp: , Rfl:     potassium chloride SA (K-DUR,KLOR-CON) 20 MEQ tablet, Take 20 mEq by mouth., Disp: , Rfl:     predniSONE (DELTASONE) 10 MG tablet, Take 1 tablet (10 mg total) by mouth once daily. Take 2 po daily for 2 days. Then, 1 po daily until gone., Disp: 7 tablet, Rfl: 0    promethazine (PHENERGAN) 6.25  mg/5 mL syrup, Take 10 mLs by mouth every 6 (six) hours as needed., Disp: , Rfl:     tiZANidine (ZANAFLEX) 4 MG tablet, 1 or 2 at bedtime as needed for muscle spasm, Disp: 60 tablet, Rfl: 2     SOCIAL HISTORY:   Social History     Socioeconomic History    Marital status:    Tobacco Use    Smoking status: Never    Smokeless tobacco: Never        FAMILY HISTORY: No family history on file.       PHYSICAL EXAM:      There were no vitals filed for this visit.  Body mass index is 30.47 kg/m².    GENERAL: Well-developed, well-nourished female . The patient is alert, oriented and cooperative.    HEENT:  Normocephalic, atraumatic.  Extraocular movements are intact bilaterally.     NECK:  Nontender with good range of motion.    LUNGS:  Clear to auscultation bilaterally.    HEART:  Regular rate and rhythm.     ABDOMEN:  Soft, non-tender, non-distended.      EXTREMITIES:  Right knee was skin clean dry and intact, mild tenderness to palpation along medial and lateral joint line, crepitus noted over patella with movement, range of motion from 0-100 degrees, knee is stable to varus and valgus stress testing, neurovascularly intact      RADIOGRAPHIC FINDINGS:   None today     Patient Active Problem List    Diagnosis Date Noted    Dysphagia 05/03/2024    Hypercalcemia 05/03/2024    Mild cervical dysplasia 05/03/2024    Non-toxic goiter 05/03/2024    Obesity due to excess calories 05/03/2024    Obesity with body mass index 30 or greater 05/03/2024    Osteoarthritis of knee 05/03/2024    Vaginal high risk human papillomavirus (HPV) DNA test positive 05/03/2024    Dysplasia of cervix uteri 06/21/2023    Status post conization of cervix 06/21/2023     IMPRESSION AND PLAN:  Primary osteoarthritis right knee.  Personally reviewed previous x-rays with mild-to-moderate tricompartmental DJD changes with loss of joint space, no fracture or dislocation.  Discussed all treatment options with the patient today.  Discussed possible steroid  injection, she deferred at this time.  Patient would like to try physical therapy as she states it has helped in the past with other joint problems.  Follow up with me in 6-8 weeks after completion of physical therapy, sooner p.r.n..    No follow-ups on file.       Bernie Church PA-C      (Subject to voice recognition error, transcription service not allowed)

## 2024-05-21 ENCOUNTER — CLINICAL SUPPORT (OUTPATIENT)
Dept: REHABILITATION | Facility: HOSPITAL | Age: 59
End: 2024-05-21
Payer: COMMERCIAL

## 2024-05-21 DIAGNOSIS — M25.561 CHRONIC PAIN OF RIGHT KNEE: ICD-10-CM

## 2024-05-21 DIAGNOSIS — M25.661 DECREASED ROM OF RIGHT KNEE: ICD-10-CM

## 2024-05-21 DIAGNOSIS — M17.11 PRIMARY OSTEOARTHRITIS OF RIGHT KNEE: Primary | ICD-10-CM

## 2024-05-21 DIAGNOSIS — M62.81 QUADRICEPS WEAKNESS: ICD-10-CM

## 2024-05-21 DIAGNOSIS — G89.29 CHRONIC PAIN OF RIGHT KNEE: ICD-10-CM

## 2024-05-21 PROCEDURE — 97110 THERAPEUTIC EXERCISES: CPT

## 2024-05-21 PROCEDURE — 97161 PT EVAL LOW COMPLEX 20 MIN: CPT

## 2024-05-21 NOTE — PLAN OF CARE
[facility]   Physical Therapy Initial Evaluation      Name: Laura Reynoso  Clinic Number: [id number]    Therapy Diagnosis:   Encounter Diagnosis   Name Primary?    Chronic pain of right knee         Physician: Bernie Church PA    Physician Orders: PT Eval and Treat   Medical Diagnosis from Referral: see above  Evaluation Date: 2024  Authorization Period Expiration: 2024 - evaluation only approved  Plan of Care Expiration: 2024    Date of Surgery: n/a  Visit # / Visits authorized:  - evaluation only approved   FOTO: 1/ 3    Precautions: Standard     Time In: 7:55 am  Time Out: 8:29 am  Total Billable Time: 34 minutes    Subjective     Date of onset: chronic    History of current condition - Laura reports: cannot state how long the knee has been hurting but it has been years - could not recall what was discussed at her ortho appt - did therapy years ago for the knee at [facility]     Falls: n/a    Imaging: xray right knee: tricompartmental osteoarthritis     Prior Therapy: years ago  Social History:  lives with their spouse  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 and is on her feet a lot - works 3 days per week now - used to work 5 days but had to cut back because of the knee  Prior Level of Function: independent   Current Level of Function: independent - just deals with the pain and rests when she needs to    Pain:  Current 7/10, worst 10/10, best 5/10   Location: right knee    Description: Aching, Throbbing, Sharp, Shooting, and Variable  Aggravating Factors: Standing, Bending, Walking, Night Time, stairs and squatting  Easing Factors: hot bath, rest, and Voltaren    Patients goals: be able to stand, walk and work without pain     Medical History:   No past medical history on file.    Surgical History:   Laura Reynoso  has a past surgical history that includes  section and Cold knife conization of cervix (N/A,  6/21/2023).    Medications:   Laura has a current medication list which includes the following prescription(s): calcium carbonate, calcium carbonate-vitamin d3, ferrous gluconate, potassium chloride sa, prednisone, promethazine, and tizanidine.    Allergies:   Review of patient's allergies indicates:   Allergen Reactions    Nsaids (non-steroidal anti-inflammatory drug)      Other Reaction(s): Gastric Ulcer        Objective        Range of motion:  Motion Right Left    Knee extension   -2 degrees   WITHIN FUNCTIONAL LIMITS   Knee flexion   110 degrees   WITHIN FUNCTIONAL LIMITS       Manual muscle test   Muscle Right  Left    Knee extension  MMT strength: 3+/5  MMT strength: 4+/5   Knee flexion  MMT strength: 3+/5  MMT strength: 4+/5       Gait:  Weight bearing precautions: FWB  Assistive device: none  Ambulation distance and deviations:  Stairs: one step at a time - leading with left lower extremity   Comments:      Clinical Special Tests:    Knee  Anterior drawer: right Negative   Posterior drawer: right Negative   Varus stress test: right Positive - pain not instability  Valgus stress test: right Negative   PFJ grind test: right Positive   McMurrays: right Positive     Comments: pain is more on the lateral aspect and under the knee cap    Intake Outcome Measure for FOTO Knee Survey    Therapist reviewed FOTO scores for Laura Reynoso on 5/21/2024.   FOTO report - see Media section or FOTO account episode details.    Intake Score: 19         Clinical Information for Insurance Authorization     Dates of Services: 05/21/2024 to 07/12/2024  Discharge Plan: Patient will be discharged from skilled physical therapy treatment once all goals have been met, patient has plateaued, or physician/insurance requests discontinuation of care. Patient will be discharged with a home exercise program.   Type of therapy: Rehabilitative  ICD-10 Diagnosis Code(s): M17.11  Which side is symptomatic? Right  Surgical: No  Surgical  procedure: N/A  Surgery date: N/A.  Presenting symptoms/diagnoses are repetitive in nature.  Presenting symptoms are non-radiating in nature.   The rehabilitation is not related to a diagnosis of cancer.  The rehabilitation is not related to a diagnosis of lymphedema.  Patient's clinical presentation is:  Severe objective and functional deficits: consistent intense symptoms with severe loss of range of motion, strength, or ability to perform daily tasks  CPT Codes Requested:  16712 [therapeutic exercise], 48120 [neuromuscular re-education], 55970 [gait training], 03000 [manual therapy], 97155 [therapeutic activities], 37077 [unattended electrical stimulation], 36864 [biofeedback by any modality], and 14257 [vasopneumatic device]     Treatment     Total Treatment time (time-based codes) separate from Evaluation: 9 minutes     Laura received the treatments listed below:      Quad set w/ towel roll under heel, short arc quad, and bridging    Patient Education and Home Exercises     Education provided:   - evaluation results, plan of care and home exercise program     Written Home Exercises Provided: yes. Exercises were reviewed and Laura was able to demonstrate them prior to the end of the session.  Laura demonstrated good  understanding of the education provided. See EMR under Patient Instructions for exercises provided during therapy sessions.    Assessment     Laura is a 58 y.o. female referred to outpatient Physical Therapy with a medical diagnosis of chronic right knee pain. Patient presents with complaints of right knee pain mainly on the lateral aspect and under the knee cap. She has pain with standing, walking, squatting, bending over, and up/down stairs. The knee aches and throbs a lot at night and keeps her up. She has poor quad control, mild resting extension lag and mild decrease in flexion range of motion. Her xrays showed tricompartmental osteoarthritis. Will work to improve strength, range of motion and  quad control.     Patient prognosis is Good.   Patient will benefit from skilled outpatient Physical Therapy to address the deficits stated above and in the chart below, provide patient /family education, and to maximize patientt's level of independence.     Plan of care discussed with patient: Yes  Patient's spiritual, cultural and educational needs considered and patient is agreeable to the plan of care and goals as stated below:     Anticipated Barriers for therapy: tricompartmental osteoarthritis     Medical Necessity is demonstrated by the following  History  Co-morbidities and personal factors that may impact the plan of care [] LOW: no personal factors / co-morbidities  [x] MODERATE: 1-2 personal factors / co-morbidities  [] HIGH: 3+ personal factors / co-morbidities    Moderate / High Support Documentation:   Co-morbidities affecting plan of care: tricompartmental osteoarthritis     Personal Factors:   no deficits     Examination  Body Structures and Functions, activity limitations and participation restrictions that may impact the plan of care [x] LOW: addressing 1-2 elements  [] MODERATE: 3+ elements  [] HIGH: 4+ elements (please support below)    Moderate / High Support Documentation: n/a     Clinical Presentation [x] LOW: stable  [] MODERATE: Evolving  [] HIGH: Unstable     Decision Making/ Complexity Score: low         SHORT TERM GOALS  1.  Patient to be independent with home exercise program to facilitate carryover between therapy visits.  2.  Patient will have 0-120 degrees range of motion right knee for improved mobility.  3.  Patient will perform straight leg raise and hold 10 seconds without quad lag for increased quad control.  4.  Patient will increase manual muscle test of right lower extremity to 4+ to 5/5 for increased stability with gait and activiites of daily living.    LONG TERM GOALS  1.  Patient will go up/down stairs reciprocal pattern with handrails as needed and good eccentric control  on right lower extremity.  2.  Patient will ambulate community distances without deviation and without pain in right knee.  3.  Patient will be able to perform work related tasks of bending and squatting with </= 1/10 pain in right knee.     Plan     Plan of care Certification: 5/21/2024 to 7/12/2024.    Outpatient Physical Therapy 2 times weekly for 8 weeks to include the following interventions: Electrical Stimulation NMES, Gait Training, Manual Therapy, Moist Heat/ Ice, Neuromuscular Re-ed, Patient Education, Therapeutic Activities, and Therapeutic Exercise.     ALYSSIA HOLLOWAY, PT        Physician's Signature: _________________________________________ Date: ________________

## 2024-05-22 PROBLEM — M25.661 DECREASED ROM OF RIGHT KNEE: Status: ACTIVE | Noted: 2024-05-22

## 2024-05-22 PROBLEM — M25.561 CHRONIC PAIN OF RIGHT KNEE: Status: ACTIVE | Noted: 2024-05-22

## 2024-05-22 PROBLEM — G89.29 CHRONIC PAIN OF RIGHT KNEE: Status: ACTIVE | Noted: 2024-05-22

## 2024-05-22 PROBLEM — M62.81 QUADRICEPS WEAKNESS: Status: ACTIVE | Noted: 2024-05-22

## 2024-05-23 ENCOUNTER — CLINICAL SUPPORT (OUTPATIENT)
Dept: REHABILITATION | Facility: HOSPITAL | Age: 59
End: 2024-05-23
Payer: COMMERCIAL

## 2024-05-23 DIAGNOSIS — M17.11 PRIMARY OSTEOARTHRITIS OF RIGHT KNEE: Primary | ICD-10-CM

## 2024-05-23 DIAGNOSIS — M62.81 QUADRICEPS WEAKNESS: ICD-10-CM

## 2024-05-23 DIAGNOSIS — M25.561 CHRONIC PAIN OF RIGHT KNEE: ICD-10-CM

## 2024-05-23 DIAGNOSIS — G89.29 CHRONIC PAIN OF RIGHT KNEE: ICD-10-CM

## 2024-05-23 DIAGNOSIS — M25.661 DECREASED ROM OF RIGHT KNEE: ICD-10-CM

## 2024-05-23 PROCEDURE — 97016 VASOPNEUMATIC DEVICE THERAPY: CPT | Mod: CQ

## 2024-05-23 PROCEDURE — 97112 NEUROMUSCULAR REEDUCATION: CPT | Mod: CQ

## 2024-05-23 PROCEDURE — 97110 THERAPEUTIC EXERCISES: CPT | Mod: CQ

## 2024-05-23 NOTE — PROGRESS NOTES
[facility]   Physical Therapy Treatment Note      Name: Laura Reynoso  Clinic Number: [id number]    Therapy Diagnosis:   Encounter Diagnoses   Name Primary?    Primary osteoarthritis of right knee Yes    Chronic pain of right knee     Quadriceps weakness     Decreased ROM of right knee      Physician: Bernie Church PA    Visit Date: 5/23/2024         Encounter Diagnosis   Name Primary?    Chronic pain of right knee          Physician: Bernie Church PA     Physician Orders: PT Eval and Treat   Medical Diagnosis from Referral: see above  Evaluation Date: 5/21/2024  Authorization Period Expiration: 5/21/2024 - evaluation only approved  Plan of Care Expiration: 7/12/2024     Date of Surgery: n/a  Visit # / Visits authorized: 2 / 9   FOTO: 1/ 3     Precautions: Standard      Time In: 10:00 am  Time Out: 10:53 am  Total Billable Time: 33 minutes    PTA Visit #: 1/5       Subjective     Patient reports: knee is hurting today with crepitus in it.  She was compliant with home exercise program.  Response to previous treatment: First since eval  Functional change: None    Pain: 6/10  Location: right knee      Prior Level of Function: independent   Current Level of Function: independent - just deals with the pain and rests when she needs to    Objective      Objective Measures updated at progress report unless specified.     Treatment     Laura received the treatments listed below:      therapeutic exercises to develop strength, endurance, ROM, and flexibility for 15 minutes including:  NuStep x 5 minutes  SB Stretch 4x20 sec  HS Stretch 3x20 sec  Calf Raises, 2x10  Squats, 2x10  Hip Abd (B) 20x     manual therapy techniques: Joint mobilizations, Manual traction, and Myofacial release were applied to the: knee for 0 minutes, including:      neuromuscular re-education activities to improve: Balance, Coordination, Kinesthetic, and Proprioception for 8 minutes. The following activities were  included:  QS 20x5 sec hold  SAQ 20x5 sec hold  SLR with Strap, 20x5 sec hold  TERMINAL KNEE EXTENSION silver 20x    therapeutic activities to improve functional performance for 0  minutes, including:      GameReady x 10 mins, low pressure, end of session      Patient Education and Home Exercises       Education provided:   - None    Written Home Exercises Provided: Patient instructed to cont prior HEP. Exercises were reviewed and Laura was able to demonstrate them prior to the end of the session.  Laura demonstrated good  understanding of the education provided. See Electronic Medical Record under Patient Instructions for exercises provided during therapy sessions    Assessment     Current:  Pt has audible and palpable crepitus in her R knee with some swelling around joint line. Symptoms consistent with diagnosis. Lack of full extension so focused on quad setting/table exercises focusing on this initially. Progressed into WB exercises with fatigue noted. Ended session with GameReady to help with soreness and pain. Time billed reflects time spent one on one with pt.       From Evaluation:  Laura is a 58 y.o. female referred to outpatient Physical Therapy with a medical diagnosis of chronic right knee pain. Patient presents with complaints of right knee pain mainly on the lateral aspect and under the knee cap. She has pain with standing, walking, squatting, bending over, and up/down stairs. The knee aches and throbs a lot at night and keeps her up. She has poor quad control, mild resting extension lag and mild decrease in flexion range of motion. Her xrays showed tricompartmental osteoarthritis. Will work to improve strength, range of motion and quad control.     Laura Is progressing well towards her goals.   Patient prognosis is Good.     Patient will continue to benefit from skilled outpatient physical therapy to address the deficits listed in the problem list box on initial evaluation, provide pt/family education  and to maximize pt's level of independence in the home and community environment.     Patient's spiritual, cultural and educational needs considered and pt agreeable to plan of care and goals.     Anticipated barriers to physical therapy: None    Goals: SHORT TERM GOALS  1.  Patient to be independent with home exercise program to facilitate carryover between therapy visits.  2.  Patient will have 0-120 degrees range of motion right knee for improved mobility.  3.  Patient will perform straight leg raise and hold 10 seconds without quad lag for increased quad control.  4.  Patient will increase manual muscle test of right lower extremity to 4+ to 5/5 for increased stability with gait and activiites of daily living.     LONG TERM GOALS  1.  Patient will go up/down stairs reciprocal pattern with handrails as needed and good eccentric control on right lower extremity.  2.  Patient will ambulate community distances without deviation and without pain in right knee.  3.  Patient will be able to perform work related tasks of bending and squatting with </= 1/10 pain in right knee.        Plan     Plan of care Certification: 5/21/2024 to 7/12/2024.     Outpatient Physical Therapy 2 times weekly for 8 weeks to include the following interventions: Electrical Stimulation NMES, Gait Training, Manual Therapy, Moist Heat/ Ice, Neuromuscular Re-ed, Patient Education, Therapeutic Activities, and Therapeutic Exercise.     Chau Hillman, PTA

## 2024-05-28 ENCOUNTER — CLINICAL SUPPORT (OUTPATIENT)
Dept: REHABILITATION | Facility: HOSPITAL | Age: 59
End: 2024-05-28
Payer: COMMERCIAL

## 2024-05-28 DIAGNOSIS — G89.29 CHRONIC PAIN OF RIGHT KNEE: ICD-10-CM

## 2024-05-28 DIAGNOSIS — M62.81 QUADRICEPS WEAKNESS: ICD-10-CM

## 2024-05-28 DIAGNOSIS — M17.11 PRIMARY OSTEOARTHRITIS OF RIGHT KNEE: Primary | ICD-10-CM

## 2024-05-28 DIAGNOSIS — M25.661 DECREASED ROM OF RIGHT KNEE: ICD-10-CM

## 2024-05-28 DIAGNOSIS — M25.561 CHRONIC PAIN OF RIGHT KNEE: ICD-10-CM

## 2024-05-28 PROCEDURE — 97112 NEUROMUSCULAR REEDUCATION: CPT

## 2024-05-28 PROCEDURE — 97016 VASOPNEUMATIC DEVICE THERAPY: CPT

## 2024-05-28 PROCEDURE — 97110 THERAPEUTIC EXERCISES: CPT

## 2024-05-28 NOTE — PROGRESS NOTES
[facility]   Physical Therapy Treatment Note      Name: Laura Reynoso  Clinic Number: [id number]    Therapy Diagnosis:   Encounter Diagnoses   Name Primary?    Primary osteoarthritis of right knee Yes    Chronic pain of right knee     Quadriceps weakness     Decreased ROM of right knee      Physician: Bernie Church PA    Visit Date: 5/28/2024         Encounter Diagnosis   Name Primary?    Chronic pain of right knee          Physician: Bernie Church PA     Physician Orders: PT Eval and Treat   Medical Diagnosis from Referral: see above  Evaluation Date: 5/21/2024  Authorization Period Expiration: 8/19/2024 - evaluation only approved  Plan of Care Expiration: 7/12/2024     Date of Surgery: n/a  Visit # / Visits authorized: 3 / 9   FOTO: 1/ 3     Precautions: Standard      Time In: 7:05 am  Time Out: 8:06 am  Total Billable Time: 54 minutes    PTA Visit #: 0/5       Subjective     Patient reports: knee is hurting today with crepitus in it.  She was compliant with home exercise program.  Response to previous treatment: First since eval  Functional change: None    Pain: 6/10  Location: right knee      Prior Level of Function: independent   Current Level of Function: independent - just deals with the pain and rests when she needs to    Objective      Objective Measures updated at progress report unless specified.     Treatment     Laura received the treatments listed below:      therapeutic exercises to develop strength, endurance, ROM, and flexibility for 24 minutes including:  NuStep x 5 minutes  SB Stretch 4x20 sec  HS Stretch 3x20 sec  Calf Raises, 2x10  Squats, 2x10  Hip Abd (B) 20x       manual therapy techniques: Joint mobilizations, Manual traction, and Myofacial release were applied to the: knee for 0 minutes, including:      neuromuscular re-education activities to improve: Balance, Coordination, Kinesthetic, and Proprioception for 20 minutes. The following activities were  included:  QS 10x10 sec hold  SAQ 20x5 sec hold  SLR with Strap, 20x5 sec hold  TERMINAL KNEE EXTENSION silver 20x - 5sh  Bridge w/ adduction squeeze - 5sh - 2 x 10    therapeutic activities to improve functional performance for 0  minutes, including:      GameReady x 10 min to right knee, medium pressure, end of session, lower extremity elevated       Patient Education and Home Exercises       Education provided:   - None    Written Home Exercises Provided: Patient instructed to cont prior HEP. Exercises were reviewed and Laura was able to demonstrate them prior to the end of the session.  Laura demonstrated good  understanding of the education provided. See Electronic Medical Record under Patient Instructions for exercises provided during therapy sessions    Assessment     Current:  Pt has audible and palpable crepitus in her R knee with some swelling around joint line. Symptoms consistent with diagnosis. Laura had better extension today and did not require strap for straight leg raise. She did well with standing ex's. Knee stiffens up after being extended or flexed for about 5 minutes. Ended session with GameReady again as she got good relief with it last visit. Time billed reflects time spent one on one with pt.       From Evaluation:  Laura is a 58 y.o. female referred to outpatient Physical Therapy with a medical diagnosis of chronic right knee pain. Patient presents with complaints of right knee pain mainly on the lateral aspect and under the knee cap. She has pain with standing, walking, squatting, bending over, and up/down stairs. The knee aches and throbs a lot at night and keeps her up. She has poor quad control, mild resting extension lag and mild decrease in flexion range of motion. Her xrays showed tricompartmental osteoarthritis. Will work to improve strength, range of motion and quad control.     Laura Is progressing well towards her goals.   Patient prognosis is Good.     Patient will continue to  benefit from skilled outpatient physical therapy to address the deficits listed in the problem list box on initial evaluation, provide pt/family education and to maximize pt's level of independence in the home and community environment.     Patient's spiritual, cultural and educational needs considered and pt agreeable to plan of care and goals.     Anticipated barriers to physical therapy: None    Goals: SHORT TERM GOALS  1.  Patient to be independent with home exercise program to facilitate carryover between therapy visits.  2.  Patient will have 0-120 degrees range of motion right knee for improved mobility.  3.  Patient will perform straight leg raise and hold 10 seconds without quad lag for increased quad control.  4.  Patient will increase manual muscle test of right lower extremity to 4+ to 5/5 for increased stability with gait and activiites of daily living.     LONG TERM GOALS  1.  Patient will go up/down stairs reciprocal pattern with handrails as needed and good eccentric control on right lower extremity.  2.  Patient will ambulate community distances without deviation and without pain in right knee.  3.  Patient will be able to perform work related tasks of bending and squatting with </= 1/10 pain in right knee.        Plan     Plan of care Certification: 5/21/2024 to 7/12/2024.     Outpatient Physical Therapy 2 times weekly for 8 weeks to include the following interventions: Electrical Stimulation NMES, Gait Training, Manual Therapy, Moist Heat/ Ice, Neuromuscular Re-ed, Patient Education, Therapeutic Activities, and Therapeutic Exercise.     ALYSSIA HOLLOWAY, PT

## 2024-05-30 ENCOUNTER — CLINICAL SUPPORT (OUTPATIENT)
Dept: REHABILITATION | Facility: HOSPITAL | Age: 59
End: 2024-05-30
Payer: COMMERCIAL

## 2024-05-30 DIAGNOSIS — M25.561 CHRONIC PAIN OF RIGHT KNEE: ICD-10-CM

## 2024-05-30 DIAGNOSIS — G89.29 CHRONIC PAIN OF RIGHT KNEE: ICD-10-CM

## 2024-05-30 DIAGNOSIS — M17.11 PRIMARY OSTEOARTHRITIS OF RIGHT KNEE: Primary | ICD-10-CM

## 2024-05-30 DIAGNOSIS — M62.81 QUADRICEPS WEAKNESS: ICD-10-CM

## 2024-05-30 DIAGNOSIS — M25.661 DECREASED ROM OF RIGHT KNEE: ICD-10-CM

## 2024-05-30 PROCEDURE — 97110 THERAPEUTIC EXERCISES: CPT | Mod: CQ

## 2024-05-30 PROCEDURE — 97112 NEUROMUSCULAR REEDUCATION: CPT | Mod: CQ

## 2024-05-30 NOTE — PROGRESS NOTES
[facility]   Physical Therapy Treatment Note      Name: Laura Reynoso  Clinic Number: [id number]    Therapy Diagnosis:   Encounter Diagnoses   Name Primary?    Primary osteoarthritis of right knee Yes    Chronic pain of right knee     Quadriceps weakness     Decreased ROM of right knee      Physician: Bernie Church PA    Visit Date: 5/30/2024         Encounter Diagnosis   Name Primary?    Chronic pain of right knee          Physician: Bernie Church PA     Physician Orders: PT Eval and Treat   Medical Diagnosis from Referral: see above  Evaluation Date: 5/21/2024  Authorization Period Expiration: 8/19/2024 - evaluation only approved  Plan of Care Expiration: 7/12/2024     Date of Surgery: n/a  Visit # / Visits authorized: 4/ 9   FOTO: 1/ 3     Precautions: Standard      Time In: 8:15  am  Time Out: 9:00 am  Total Billable Time: 45 minutes    PTA Visit #: 1/5       Subjective     Patient reports: knee is hurting today with crepitus in it.  She was compliant with home exercise program.  Response to previous treatment: First since eval  Functional change: None    Pain: 6/10  Location: right knee      Prior Level of Function: independent   Current Level of Function: independent - just deals with the pain and rests when she needs to    Objective      Objective Measures updated at progress report unless specified.     Treatment     Laura received the treatments listed below:      therapeutic exercises to develop strength, endurance, ROM, and flexibility for 15 minutes including:  Bike x 5 min  SB Stretch 4x20 sec  HS Stretch 3x20 sec  Calf Raises, 2x10  Rail Squats, 2x10  Hip Abd green band-- 20x   Prone quad stretch--3 x 30 sec      manual therapy techniques: Joint mobilizations, Manual traction, and Myofacial release were applied to the: knee for 0 minutes, including:      neuromuscular re-education activities to improve: Balance, Coordination, Kinesthetic, and Proprioception for 30  minutes. The following activities were included:  Prone QS 10x10 sec hold  SAQ--derotated-- 20x5 sec hold  SLR supine--20x5 sec hold  TERMINAL KNEE EXTENSION silver 20x - 5sh  Bridge w/ green band - 5sh - 2 x 10  Seated LAQ--derotated--3 x 10    therapeutic activities to improve functional performance for 0  minutes, including:      GameReady x 0 min to right knee, medium pressure, end of session, lower extremity elevated       Patient Education and Home Exercises       Education provided:   - None    Written Home Exercises Provided: Patient instructed to cont prior HEP. Exercises were reviewed and Laura was able to demonstrate them prior to the end of the session.  Laura demonstrated good  understanding of the education provided. See Electronic Medical Record under Patient Instructions for exercises provided during therapy sessions    Assessment     Current:  Pt has audible and palpable crepitus in her R knee. Symptoms consistent with diagnosis. Laura had better extension today and did not require strap for straight leg raise. She did well with standing ex's. Knee stiffens up after being extended or flexed for about 5 minutes.  Pain with squats.  Time billed reflects time spent one on one with pt.       From Evaluation:  Laura is a 58 y.o. female referred to outpatient Physical Therapy with a medical diagnosis of chronic right knee pain. Patient presents with complaints of right knee pain mainly on the lateral aspect and under the knee cap. She has pain with standing, walking, squatting, bending over, and up/down stairs. The knee aches and throbs a lot at night and keeps her up. She has poor quad control, mild resting extension lag and mild decrease in flexion range of motion. Her xrays showed tricompartmental osteoarthritis. Will work to improve strength, range of motion and quad control.     Laura Is progressing well towards her goals.   Patient prognosis is Good.     Patient will continue to benefit from skilled  outpatient physical therapy to address the deficits listed in the problem list box on initial evaluation, provide pt/family education and to maximize pt's level of independence in the home and community environment.     Patient's spiritual, cultural and educational needs considered and pt agreeable to plan of care and goals.     Anticipated barriers to physical therapy: None    Goals: SHORT TERM GOALS  1.  Patient to be independent with home exercise program to facilitate carryover between therapy visits.  2.  Patient will have 0-120 degrees range of motion right knee for improved mobility.  3.  Patient will perform straight leg raise and hold 10 seconds without quad lag for increased quad control.  4.  Patient will increase manual muscle test of right lower extremity to 4+ to 5/5 for increased stability with gait and activiites of daily living.     LONG TERM GOALS  1.  Patient will go up/down stairs reciprocal pattern with handrails as needed and good eccentric control on right lower extremity.  2.  Patient will ambulate community distances without deviation and without pain in right knee.  3.  Patient will be able to perform work related tasks of bending and squatting with </= 1/10 pain in right knee.        Plan     Plan of care Certification: 5/21/2024 to 7/12/2024.     Outpatient Physical Therapy 2 times weekly for 8 weeks to include the following interventions: Electrical Stimulation NMES, Gait Training, Manual Therapy, Moist Heat/ Ice, Neuromuscular Re-ed, Patient Education, Therapeutic Activities, and Therapeutic Exercise.     Anurag Nunez, PTA

## 2024-06-03 NOTE — PROGRESS NOTES
[facility]   Physical Therapy Treatment Note      Name: Laura Reynoso  Clinic Number: [id number]    Therapy Diagnosis:   Encounter Diagnoses   Name Primary?    Chronic pain of right knee Yes    Quadriceps weakness     Decreased ROM of right knee      Physician: Bernie Church PA    Visit Date: 6/4/2024         Encounter Diagnosis   Name Primary?    Chronic pain of right knee          Physician: Bernie Church PA     Physician Orders: PT Eval and Treat   Medical Diagnosis from Referral: see above  Evaluation Date: 5/21/2024  Authorization Period Expiration: 8/19/2024   Plan of Care Expiration: 7/12/2024     Date of Surgery: n/a  Visit # / Visits authorized: 5/ 9   FOTO: 1/ 3     Precautions: Standard      Time In: 8:15  am  Time Out: 9:00 am  Total Billable Time: 45 minutes    PTA Visit #: 2/5       Subjective     Patient reports: knee is hurting today with crepitus in it.  She was compliant with home exercise program.  Response to previous treatment: First since eval  Functional change: None    Pain: 6/10  Location: right knee      Prior Level of Function: independent   Current Level of Function: independent - just deals with the pain and rests when she needs to    Objective      Objective Measures updated at progress report unless specified.     Treatment     Laura received the treatments listed below:      therapeutic exercises to develop strength, endurance, ROM, and flexibility for 15 minutes including:  Bike x 5 min  SB Stretch 4x20 sec  HS Stretch 3x20 sec  Calf Raises, 2x10  Rail Squats, 2x10  Hip Abd green band-- 20x   Prone quad stretch--3 x 30 sec      manual therapy techniques: Joint mobilizations, Manual traction, and Myofacial release were applied to the: knee for 0 minutes, including:      neuromuscular re-education activities to improve: Balance, Coordination, Kinesthetic, and Proprioception for 30 minutes. The following activities were included:  Prone QS 10x10 sec  hold  SAQ--derotated-- 20x5 sec hold  SLR supine--20x5 sec hold  TERMINAL KNEE EXTENSION silver 20x - 5sh  Bridge w/ green band - 5sh - 2 x 10  Seated LAQ--derotated--3 x 10    therapeutic activities to improve functional performance for 0  minutes, including:      GameReady x 0 min to right knee, medium pressure, end of session, lower extremity elevated       Patient Education and Home Exercises       Education provided:   - None    Written Home Exercises Provided: Patient instructed to cont prior HEP. Exercises were reviewed and Laura was able to demonstrate them prior to the end of the session.  Laura demonstrated good  understanding of the education provided. See Electronic Medical Record under Patient Instructions for exercises provided during therapy sessions    Assessment     Current:  Pt has audible and palpable crepitus in her R knee. Symptoms consistent with diagnosis. Laura had better extension today and did not require strap for straight leg raise. She did well with standing ex's. Knee stiffens up after being extended or flexed for about 5 minutes.  Pain with squats.  Time billed reflects time spent one on one with pt.       From Evaluation:  Laura is a 58 y.o. female referred to outpatient Physical Therapy with a medical diagnosis of chronic right knee pain. Patient presents with complaints of right knee pain mainly on the lateral aspect and under the knee cap. She has pain with standing, walking, squatting, bending over, and up/down stairs. The knee aches and throbs a lot at night and keeps her up. She has poor quad control, mild resting extension lag and mild decrease in flexion range of motion. Her xrays showed tricompartmental osteoarthritis. Will work to improve strength, range of motion and quad control.     Laura Is progressing well towards her goals.   Patient prognosis is Good.     Patient will continue to benefit from skilled outpatient physical therapy to address the deficits listed in the  problem list box on initial evaluation, provide pt/family education and to maximize pt's level of independence in the home and community environment.     Patient's spiritual, cultural and educational needs considered and pt agreeable to plan of care and goals.     Anticipated barriers to physical therapy: None    Goals: SHORT TERM GOALS  1.  Patient to be independent with home exercise program to facilitate carryover between therapy visits.  2.  Patient will have 0-120 degrees range of motion right knee for improved mobility.  3.  Patient will perform straight leg raise and hold 10 seconds without quad lag for increased quad control.  4.  Patient will increase manual muscle test of right lower extremity to 4+ to 5/5 for increased stability with gait and activiites of daily living.     LONG TERM GOALS  1.  Patient will go up/down stairs reciprocal pattern with handrails as needed and good eccentric control on right lower extremity.  2.  Patient will ambulate community distances without deviation and without pain in right knee.  3.  Patient will be able to perform work related tasks of bending and squatting with </= 1/10 pain in right knee.        Plan     Plan of care Certification: 5/21/2024 to 7/12/2024.     Outpatient Physical Therapy 2 times weekly for 8 weeks to include the following interventions: Electrical Stimulation NMES, Gait Training, Manual Therapy, Moist Heat/ Ice, Neuromuscular Re-ed, Patient Education, Therapeutic Activities, and Therapeutic Exercise.     Paige Silverio, PTA

## 2024-06-04 ENCOUNTER — CLINICAL SUPPORT (OUTPATIENT)
Dept: REHABILITATION | Facility: HOSPITAL | Age: 59
End: 2024-06-04
Payer: COMMERCIAL

## 2024-06-04 DIAGNOSIS — M62.81 QUADRICEPS WEAKNESS: ICD-10-CM

## 2024-06-04 DIAGNOSIS — M25.661 DECREASED ROM OF RIGHT KNEE: ICD-10-CM

## 2024-06-04 DIAGNOSIS — M25.561 CHRONIC PAIN OF RIGHT KNEE: Primary | ICD-10-CM

## 2024-06-04 DIAGNOSIS — G89.29 CHRONIC PAIN OF RIGHT KNEE: Primary | ICD-10-CM

## 2024-06-04 PROCEDURE — 97110 THERAPEUTIC EXERCISES: CPT | Mod: CQ

## 2024-06-04 PROCEDURE — 97112 NEUROMUSCULAR REEDUCATION: CPT | Mod: CQ

## 2024-06-04 NOTE — PROGRESS NOTES
[facility]   Physical Therapy Treatment Note      Name: Laura Reynoso  Clinic Number: [id number]    Therapy Diagnosis:   Encounter Diagnoses   Name Primary?    Chronic pain of right knee Yes    Quadriceps weakness     Decreased ROM of right knee      Physician: Bernie Church PA    Visit Date: 6/4/2024         Encounter Diagnosis   Name Primary?    Chronic pain of right knee          Physician: Bernie Church PA     Physician Orders: PT Eval and Treat   Medical Diagnosis from Referral: see above  Evaluation Date: 5/21/2024  Authorization Period Expiration: 8/19/2024  Plan of Care Expiration: 7/12/2024     Date of Surgery: n/a  Visit # / Visits authorized: 5/ 9   FOTO: 1/ 3     Precautions: Standard      Time In: 7:23 am  Time Out:  8:18 am  Total Billable Time:  55 minutes    PTA Visit #: 2/5       Subjective     Patient reports: knee is hurting today with crepitus in it.  Reports nagging pain all day  She was compliant with home exercise program.  Response to previous treatment: First since eval  Functional change: None    Pain: 6/10  Location: right knee      Prior Level of Function: independent   Current Level of Function: independent - just deals with the pain and rests when she needs to    Objective      Objective Measures updated at progress report unless specified.     Treatment     Laura received the treatments listed below:      therapeutic exercises to develop strength, endurance, ROM, and flexibility for 17 minutes including:  Bike x 5 min  SB Stretch 4x20 sec  HS Stretch 3x20 sec  Calf Raises, 2 x 10  Hip Abd green band-- 20x   Prone quad stretch--3 x 30 sec    (Not Today)  Rail Squats, 2x10    manual therapy techniques: Joint mobilizations, Manual traction, and Myofacial release were applied to the: knee for 0 minutes, including:      neuromuscular re-education activities to improve: Balance, Coordination, Kinesthetic, and Proprioception for 38 minutes. The following  activities were included:  Prone QS 10x10 sec hold  SAQ--derotated-- 20x5 sec hold--2#  SLR--muncie--supine--4 x 5 x 5 sec hold  TERMINAL KNEE EXTENSION silver 20x - 5sh  Bridge w/ green band - 5sh - 2 x 10  Seated LAQ--derotated--2#--3 x 10  Bilateral Leg Press--5 plates--3 x 10    therapeutic activities to improve functional performance for 0  minutes, including:      GameReady x 0 min to right knee, medium pressure, end of session, lower extremity elevated       Patient Education and Home Exercises       Education provided:   - None    Written Home Exercises Provided: Patient instructed to cont prior HEP. Exercises were reviewed and Laura was able to demonstrate them prior to the end of the session.  Laura demonstrated good  understanding of the education provided. See Electronic Medical Record under Patient Instructions for exercises provided during therapy sessions    Assessment     Current:  Pt has audible and palpable crepitus in her R knee. Reports she has nagging pain behind patella @ rest and with motion.  Prolonged sitting really stiffens it up.  Was able to do a long sitting SLR today.  She did well with addition of Leg press today... did these instead of rail squats due to pain with rail squats.  Time billed reflects time spent one on one with PT.  Cont POC      From Evaluation:  Laura is a 58 y.o. female referred to outpatient Physical Therapy with a medical diagnosis of chronic right knee pain. Patient presents with complaints of right knee pain mainly on the lateral aspect and under the knee cap. She has pain with standing, walking, squatting, bending over, and up/down stairs. The knee aches and throbs a lot at night and keeps her up. She has poor quad control, mild resting extension lag and mild decrease in flexion range of motion. Her xrays showed tricompartmental osteoarthritis. Will work to improve strength, range of motion and quad control.     Laura Is progressing well towards her goals.    Patient prognosis is Good.     Patient will continue to benefit from skilled outpatient physical therapy to address the deficits listed in the problem list box on initial evaluation, provide pt/family education and to maximize pt's level of independence in the home and community environment.     Patient's spiritual, cultural and educational needs considered and pt agreeable to plan of care and goals.     Anticipated barriers to physical therapy: None    Goals: SHORT TERM GOALS  1.  Patient to be independent with home exercise program to facilitate carryover between therapy visits.  2.  Patient will have 0-120 degrees range of motion right knee for improved mobility.  3.  Patient will perform straight leg raise and hold 10 seconds without quad lag for increased quad control.  4.  Patient will increase manual muscle test of right lower extremity to 4+ to 5/5 for increased stability with gait and activiites of daily living.     LONG TERM GOALS  1.  Patient will go up/down stairs reciprocal pattern with handrails as needed and good eccentric control on right lower extremity.  2.  Patient will ambulate community distances without deviation and without pain in right knee.  3.  Patient will be able to perform work related tasks of bending and squatting with </= 1/10 pain in right knee.        Plan     Plan of care Certification: 5/21/2024 to 7/12/2024.     Outpatient Physical Therapy 2 times weekly for 8 weeks to include the following interventions: Electrical Stimulation NMES, Gait Training, Manual Therapy, Moist Heat/ Ice, Neuromuscular Re-ed, Patient Education, Therapeutic Activities, and Therapeutic Exercise.     Anurag Nunez, PTA

## 2024-06-06 ENCOUNTER — CLINICAL SUPPORT (OUTPATIENT)
Dept: REHABILITATION | Facility: HOSPITAL | Age: 59
End: 2024-06-06
Payer: COMMERCIAL

## 2024-06-06 DIAGNOSIS — G89.29 CHRONIC PAIN OF RIGHT KNEE: ICD-10-CM

## 2024-06-06 DIAGNOSIS — M17.11 PRIMARY OSTEOARTHRITIS OF RIGHT KNEE: Primary | ICD-10-CM

## 2024-06-06 DIAGNOSIS — M25.661 DECREASED ROM OF RIGHT KNEE: ICD-10-CM

## 2024-06-06 DIAGNOSIS — M62.81 QUADRICEPS WEAKNESS: ICD-10-CM

## 2024-06-06 DIAGNOSIS — M25.561 CHRONIC PAIN OF RIGHT KNEE: ICD-10-CM

## 2024-06-06 PROCEDURE — 97110 THERAPEUTIC EXERCISES: CPT | Mod: CQ

## 2024-06-06 PROCEDURE — 97112 NEUROMUSCULAR REEDUCATION: CPT | Mod: CQ

## 2024-06-06 NOTE — PROGRESS NOTES
"[facility]   Physical Therapy Treatment Note      Name: Laura Reynoso  Clinic Number: [id number]    Therapy Diagnosis:   Encounter Diagnoses   Name Primary?    Primary osteoarthritis of right knee Yes    Chronic pain of right knee     Quadriceps weakness     Decreased ROM of right knee      Physician: Bernie Church PA    Visit Date: 6/6/2024         Encounter Diagnosis   Name Primary?    Chronic pain of right knee          Physician: Bernie Church PA     Physician Orders: PT Eval and Treat   Medical Diagnosis from Referral: see above  Evaluation Date: 5/21/2024  Authorization Period Expiration: 8/19/2024  Plan of Care Expiration: 7/12/2024     Date of Surgery: n/a  Visit # / Visits authorized: 6/ 9   FOTO: 1/ 3     Precautions: Standard      Time In: 7:18 am  Time Out:   8:13 am  Total Billable Time:  55 minutes    PTA Visit #: 3/5       Subjective     Patient reports: her knee is "Aching" this morning  She was compliant with home exercise program.  Response to previous treatment:   Functional change: None    Pain: 6/10  Location: right knee      Prior Level of Function: independent   Current Level of Function: independent - just deals with the pain and rests when she needs to    Objective      Objective Measures updated at progress report unless specified.     Treatment     Laura received the treatments listed below:      therapeutic exercises to develop strength, endurance, ROM, and flexibility for 17 minutes including:  Bike x 5 min  SB Stretch 4x20 sec  HS Stretch 3x20 sec  Calf Raises, 2 x 10  Hip Abd green band-- 20x   Prone quad stretch--3 x 30 sec    (Not Today)  Rail Squats, 2x10    manual therapy techniques: Joint mobilizations, Manual traction, and Myofacial release were applied to the: knee for 0 minutes, including:      neuromuscular re-education activities to improve: Balance, Coordination, Kinesthetic, and Proprioception for 38 minutes. The following activities were " included:  Prone QS 10x10 sec hold  SAQ--derotated-- 20x5 sec hold--2#  SLR--nazvon--supine--4 x 5 x 5 sec hold  TERMINAL KNEE EXTENSION silver 20x - 5sh  Bridge w/ green band - 5sh - 2 x 10  Seated LAQ--derotated--2#--3 x 10  Bilateral Leg Press--5 plates--3 x 10    therapeutic activities to improve functional performance for 0  minutes, including:      GameReady x 0 min to right knee, medium pressure, end of session, lower extremity elevated       Patient Education and Home Exercises       Education provided:   - None    Written Home Exercises Provided: Patient instructed to cont prior HEP. Exercises were reviewed and Laura was able to demonstrate them prior to the end of the session.  Laura demonstrated good  understanding of the education provided. See Electronic Medical Record under Patient Instructions for exercises provided during therapy sessions    Assessment     Current:  Pt has audible and palpable crepitus in her R knee. Reports she has nagging pain behind patella @ rest and with motion.  Prolonged sitting really stiffens it up.  Was able to do a long sitting SLR today.  She did well with addition of chair stretch today.  Time billed reflects time spent one on one with PT.  Cont POC      From Evaluation:  Laura is a 58 y.o. female referred to outpatient Physical Therapy with a medical diagnosis of chronic right knee pain. Patient presents with complaints of right knee pain mainly on the lateral aspect and under the knee cap. She has pain with standing, walking, squatting, bending over, and up/down stairs. The knee aches and throbs a lot at night and keeps her up. She has poor quad control, mild resting extension lag and mild decrease in flexion range of motion. Her xrays showed tricompartmental osteoarthritis. Will work to improve strength, range of motion and quad control.     Laura Is progressing well towards her goals.   Patient prognosis is Good.     Patient will continue to benefit from skilled  outpatient physical therapy to address the deficits listed in the problem list box on initial evaluation, provide pt/family education and to maximize pt's level of independence in the home and community environment.     Patient's spiritual, cultural and educational needs considered and pt agreeable to plan of care and goals.     Anticipated barriers to physical therapy: None    Goals: SHORT TERM GOALS  1.  Patient to be independent with home exercise program to facilitate carryover between therapy visits.  2.  Patient will have 0-120 degrees range of motion right knee for improved mobility.  3.  Patient will perform straight leg raise and hold 10 seconds without quad lag for increased quad control.  4.  Patient will increase manual muscle test of right lower extremity to 4+ to 5/5 for increased stability with gait and activiites of daily living.     LONG TERM GOALS  1.  Patient will go up/down stairs reciprocal pattern with handrails as needed and good eccentric control on right lower extremity.  2.  Patient will ambulate community distances without deviation and without pain in right knee.  3.  Patient will be able to perform work related tasks of bending and squatting with </= 1/10 pain in right knee.        Plan     Plan of care Certification: 5/21/2024 to 7/12/2024.     Outpatient Physical Therapy 2 times weekly for 8 weeks to include the following interventions: Electrical Stimulation NMES, Gait Training, Manual Therapy, Moist Heat/ Ice, Neuromuscular Re-ed, Patient Education, Therapeutic Activities, and Therapeutic Exercise.     Anurag Nunez, PTA

## 2024-06-11 ENCOUNTER — CLINICAL SUPPORT (OUTPATIENT)
Dept: REHABILITATION | Facility: HOSPITAL | Age: 59
End: 2024-06-11
Payer: COMMERCIAL

## 2024-06-11 DIAGNOSIS — M62.81 QUADRICEPS WEAKNESS: ICD-10-CM

## 2024-06-11 DIAGNOSIS — M17.11 PRIMARY OSTEOARTHRITIS OF RIGHT KNEE: Primary | ICD-10-CM

## 2024-06-11 DIAGNOSIS — M25.561 CHRONIC PAIN OF RIGHT KNEE: ICD-10-CM

## 2024-06-11 DIAGNOSIS — M25.661 DECREASED ROM OF RIGHT KNEE: ICD-10-CM

## 2024-06-11 DIAGNOSIS — G89.29 CHRONIC PAIN OF RIGHT KNEE: ICD-10-CM

## 2024-06-11 PROCEDURE — 97530 THERAPEUTIC ACTIVITIES: CPT | Mod: CQ

## 2024-06-11 PROCEDURE — 97112 NEUROMUSCULAR REEDUCATION: CPT | Mod: CQ

## 2024-06-11 NOTE — PROGRESS NOTES
"[facility]   Physical Therapy Treatment Note      Name: Laura Reynoso  Clinic Number: [id number]    Therapy Diagnosis:   Encounter Diagnoses   Name Primary?    Primary osteoarthritis of right knee Yes    Chronic pain of right knee     Quadriceps weakness     Decreased ROM of right knee      Physician: Bernie Church PA    Visit Date: 6/11/2024         Encounter Diagnosis   Name Primary?    Chronic pain of right knee          Physician: Bernie Church PA     Physician Orders: PT Eval and Treat   Medical Diagnosis from Referral: see above  Evaluation Date: 5/21/2024  Authorization Period Expiration: 8/19/2024  Plan of Care Expiration: 7/12/2024     Date of Surgery: n/a  Visit # / Visits authorized: 7/ 9   FOTO: 1/ 3     Precautions: Standard      Time In: 8:30 am  Time Out:   9:17 am  Total Billable Time:  23 individual minutes (24 min non billed)    PTA Visit #: 4/5       Subjective     Patient reports: her knee is "Aching" this morning  She was compliant with home exercise program.  Response to previous treatment:   Functional change: None    Pain: 6/10  Location: right knee      Prior Level of Function: independent   Current Level of Function: independent - just deals with the pain and rests when she needs to    Objective      Objective Measures updated at progress report unless specified.     Treatment     Laura received the treatments listed below:      therapeutic exercises to develop strength, endurance, ROM, and flexibility for 0 minutes including:  Bike x 5 min  SB Stretch 4x20 sec  HS Stretch 3x20 sec  Calf Raises, 2 x 10  Hip Abd green band-- 20x   Prone quad stretch--3 x 30 sec    (Not Today)  Rail Squats, 2x10    manual therapy techniques: Joint mobilizations, Manual traction, and Myofacial release were applied to the: knee for 0 minutes, including:      neuromuscular re-education activities to improve: Balance, Coordination, Kinesthetic, and Proprioception for 15 minutes. " The following activities were included:  Prone QS 10x10 sec hold  SAQ--derotated-- 20x5 sec hold--2#  SLR--muncie--supine--4 x 5 x 5 sec hold  TERMINAL KNEE EXTENSION--4 plates--20x - 5sh  Bridge w/ green band - 5sh - 2 x 10  Seated LAQ--derotated--2#--3 x 10  Bilateral Leg Press--5 plates--3 x 10    therapeutic activities to improve functional performance for 8 minutes, including:  KT taping for stability of R knee--% stretch.    GameReady x 0 min to right knee, medium pressure, end of session, lower extremity elevated       Patient Education and Home Exercises       Education provided:   - None    Written Home Exercises Provided: Patient instructed to cont prior HEP. Exercises were reviewed and Laura was able to demonstrate them prior to the end of the session.  Laura demonstrated good  understanding of the education provided. See Electronic Medical Record under Patient Instructions for exercises provided during therapy sessions    Assessment     Current:  Pt has audible and palpable crepitus in her R knee. Reports she has nagging pain behind patella @ rest and with motion.  Prolonged sitting really stiffens it up.  Was able to do a long sitting SLR today.  Continues to have quad weakness.  Added KT taping today for pain and stability.  Time billed reflects time spent one on one with PT.  Cont POC.  Has 2 visits left approved... Will discuss DC or cont PT at next session.        From Evaluation:  Laura is a 58 y.o. female referred to outpatient Physical Therapy with a medical diagnosis of chronic right knee pain. Patient presents with complaints of right knee pain mainly on the lateral aspect and under the knee cap. She has pain with standing, walking, squatting, bending over, and up/down stairs. The knee aches and throbs a lot at night and keeps her up. She has poor quad control, mild resting extension lag and mild decrease in flexion range of motion. Her xrays showed tricompartmental osteoarthritis. Will  work to improve strength, range of motion and quad control.     Laura Is progressing well towards her goals.   Patient prognosis is Good.     Patient will continue to benefit from skilled outpatient physical therapy to address the deficits listed in the problem list box on initial evaluation, provide pt/family education and to maximize pt's level of independence in the home and community environment.     Patient's spiritual, cultural and educational needs considered and pt agreeable to plan of care and goals.     Anticipated barriers to physical therapy: None    Goals: SHORT TERM GOALS  1.  Patient to be independent with home exercise program to facilitate carryover between therapy visits.  2.  Patient will have 0-120 degrees range of motion right knee for improved mobility.  3.  Patient will perform straight leg raise and hold 10 seconds without quad lag for increased quad control.  4.  Patient will increase manual muscle test of right lower extremity to 4+ to 5/5 for increased stability with gait and activiites of daily living.     LONG TERM GOALS  1.  Patient will go up/down stairs reciprocal pattern with handrails as needed and good eccentric control on right lower extremity.  2.  Patient will ambulate community distances without deviation and without pain in right knee.  3.  Patient will be able to perform work related tasks of bending and squatting with </= 1/10 pain in right knee.        Plan     Plan of care Certification: 5/21/2024 to 7/12/2024.     Outpatient Physical Therapy 2 times weekly for 8 weeks to include the following interventions: Electrical Stimulation NMES, Gait Training, Manual Therapy, Moist Heat/ Ice, Neuromuscular Re-ed, Patient Education, Therapeutic Activities, and Therapeutic Exercise.     Anurag Nunez, PTA

## 2024-06-13 ENCOUNTER — CLINICAL SUPPORT (OUTPATIENT)
Dept: REHABILITATION | Facility: HOSPITAL | Age: 59
End: 2024-06-13
Payer: COMMERCIAL

## 2024-06-13 DIAGNOSIS — M17.11 PRIMARY OSTEOARTHRITIS OF RIGHT KNEE: Primary | ICD-10-CM

## 2024-06-13 DIAGNOSIS — M25.661 DECREASED ROM OF RIGHT KNEE: ICD-10-CM

## 2024-06-13 DIAGNOSIS — G89.29 CHRONIC PAIN OF RIGHT KNEE: ICD-10-CM

## 2024-06-13 DIAGNOSIS — M25.561 CHRONIC PAIN OF RIGHT KNEE: ICD-10-CM

## 2024-06-13 DIAGNOSIS — M62.81 QUADRICEPS WEAKNESS: ICD-10-CM

## 2024-06-13 PROCEDURE — 97110 THERAPEUTIC EXERCISES: CPT

## 2024-06-13 PROCEDURE — 97112 NEUROMUSCULAR REEDUCATION: CPT

## 2024-06-13 NOTE — PROGRESS NOTES
"[facility]   Physical Therapy Treatment Note      Name: Laura Reynoso  Clinic Number: [id number]    Therapy Diagnosis:   Encounter Diagnoses   Name Primary?    Primary osteoarthritis of right knee Yes    Chronic pain of right knee     Quadriceps weakness     Decreased ROM of right knee      Physician: Bernie Church PA    Visit Date: 6/13/2024         Encounter Diagnosis   Name Primary?    Chronic pain of right knee          Physician: Bernie Church PA     Physician Orders: PT Eval and Treat   Medical Diagnosis from Referral: see above  Evaluation Date: 5/21/2024  Authorization Period Expiration: 8/19/2024  Plan of Care Expiration: 7/12/2024     Date of Surgery: n/a  Visit # / Visits authorized: 8/ 9   FOTO: 1/ 3     Precautions: Standard      Time In: 8:16 am  Time Out: 9:10 am  Total Billable Time:  40 individual minutes (14 min non billed)    PTA Visit #: 0/5       Subjective     Patient reports: her knee is "Aching" this morning - she gets stiff after sitting for several minutes  She was compliant with home exercise program.  Response to previous treatment:   Functional change: None    Pain: 6/10  Location: right knee      Prior Level of Function: independent   Current Level of Function: independent - just deals with the pain and rests when she needs to    Objective      Objective Measures updated at progress report unless specified.     Treatment     Laura received the treatments listed below:      therapeutic exercises to develop strength, endurance, ROM, and flexibility for 20 minutes including:  Bike x 5 min  SB Stretch 4x20 sec  HS Stretch 3x30 sec  Hip Abd blue band-- 30x   Prone quad stretch--4 x 30 sec    (Not Today)  Rail Squats, 2x10  Calf Raises, 3 x 10    manual therapy techniques: Joint mobilizations, Manual traction, and Myofacial release were applied to the: knee for 0 minutes, including:      neuromuscular re-education activities to improve: Balance, " "Coordination, Kinesthetic, and Proprioception for 20 minutes. The following activities were included:  Prone QS 10x10 sec hold  SAQ--derotated-- 20x5 sec hold--2#  SLR--charles--supine--4 x 5 x 5 sec hold  TERMINAL KNEE EXTENSION--4 plates--20x - 5sh  Bilateral Leg Press--5 plates--3 x 10    (Not today)  Bridge w/ green band - 5sh - 2 x 10  Seated LAQ--derotated--2#--3 x 10    therapeutic activities to improve functional performance for 0 minutes, including:  KT taping for stability of R knee--% stretch.    GameReady x 0 min to right knee, medium pressure, end of session, lower extremity elevated       Patient Education and Home Exercises       Education provided:   - None    Written Home Exercises Provided: Patient instructed to cont prior HEP. Exercises were reviewed and Laura was able to demonstrate them prior to the end of the session.  Laura demonstrated good  understanding of the education provided. See Electronic Medical Record under Patient Instructions for exercises provided during therapy sessions    Assessment     Current:  Pt has audible and palpable crepitus in her R knee. Reports she has nagging pain behind patella @ rest and with motion. She states her pain has not changed since starting therapy. She says it feels better when she has something compressing the knee. She is wearing a volleyball knee pad for "compression" of the knee. She says it makes it feel better. She states the tape did not really help.   Cont POC.  Has 1 visits left approved then will refer back to MD due to no improvement with pain.      From Evaluation:  Laura is a 58 y.o. female referred to outpatient Physical Therapy with a medical diagnosis of chronic right knee pain. Patient presents with complaints of right knee pain mainly on the lateral aspect and under the knee cap. She has pain with standing, walking, squatting, bending over, and up/down stairs. The knee aches and throbs a lot at night and keeps her up. She has poor " quad control, mild resting extension lag and mild decrease in flexion range of motion. Her xrays showed tricompartmental osteoarthritis. Will work to improve strength, range of motion and quad control.     Laura Is progressing well towards her goals.   Patient prognosis is Good.     Patient will continue to benefit from skilled outpatient physical therapy to address the deficits listed in the problem list box on initial evaluation, provide pt/family education and to maximize pt's level of independence in the home and community environment.     Patient's spiritual, cultural and educational needs considered and pt agreeable to plan of care and goals.     Anticipated barriers to physical therapy: None    Goals: SHORT TERM GOALS  1.  Patient to be independent with home exercise program to facilitate carryover between therapy visits.  2.  Patient will have 0-120 degrees range of motion right knee for improved mobility.  3.  Patient will perform straight leg raise and hold 10 seconds without quad lag for increased quad control.  4.  Patient will increase manual muscle test of right lower extremity to 4+ to 5/5 for increased stability with gait and activiites of daily living.     LONG TERM GOALS  1.  Patient will go up/down stairs reciprocal pattern with handrails as needed and good eccentric control on right lower extremity.  2.  Patient will ambulate community distances without deviation and without pain in right knee.  3.  Patient will be able to perform work related tasks of bending and squatting with </= 1/10 pain in right knee.        Plan     Plan of care Certification: 5/21/2024 to 7/12/2024.     Outpatient Physical Therapy 2 times weekly for 8 weeks to include the following interventions: Electrical Stimulation NMES, Gait Training, Manual Therapy, Moist Heat/ Ice, Neuromuscular Re-ed, Patient Education, Therapeutic Activities, and Therapeutic Exercise.     ALYSSIA HOLLOWAY, PT

## 2024-06-29 ENCOUNTER — OFFICE VISIT (OUTPATIENT)
Dept: FAMILY MEDICINE | Facility: CLINIC | Age: 59
End: 2024-06-29
Payer: COMMERCIAL

## 2024-06-29 VITALS
OXYGEN SATURATION: 98 % | TEMPERATURE: 98 F | SYSTOLIC BLOOD PRESSURE: 120 MMHG | BODY MASS INDEX: 29.23 KG/M2 | HEIGHT: 63 IN | DIASTOLIC BLOOD PRESSURE: 84 MMHG | RESPIRATION RATE: 20 BRPM | HEART RATE: 61 BPM | WEIGHT: 165 LBS

## 2024-06-29 DIAGNOSIS — M25.562 PAIN IN BOTH KNEES, UNSPECIFIED CHRONICITY: Primary | ICD-10-CM

## 2024-06-29 DIAGNOSIS — M25.561 PAIN IN BOTH KNEES, UNSPECIFIED CHRONICITY: Primary | ICD-10-CM

## 2024-06-29 PROCEDURE — 3079F DIAST BP 80-89 MM HG: CPT | Mod: CPTII,,, | Performed by: NURSE PRACTITIONER

## 2024-06-29 PROCEDURE — 3008F BODY MASS INDEX DOCD: CPT | Mod: CPTII,,, | Performed by: NURSE PRACTITIONER

## 2024-06-29 PROCEDURE — 1160F RVW MEDS BY RX/DR IN RCRD: CPT | Mod: CPTII,,, | Performed by: NURSE PRACTITIONER

## 2024-06-29 PROCEDURE — 99051 MED SERV EVE/WKEND/HOLIDAY: CPT | Mod: ,,, | Performed by: NURSE PRACTITIONER

## 2024-06-29 PROCEDURE — 96372 THER/PROPH/DIAG INJ SC/IM: CPT | Mod: ,,, | Performed by: NURSE PRACTITIONER

## 2024-06-29 PROCEDURE — 1159F MED LIST DOCD IN RCRD: CPT | Mod: CPTII,,, | Performed by: NURSE PRACTITIONER

## 2024-06-29 PROCEDURE — 99213 OFFICE O/P EST LOW 20 MIN: CPT | Mod: 25,,, | Performed by: NURSE PRACTITIONER

## 2024-06-29 PROCEDURE — 3074F SYST BP LT 130 MM HG: CPT | Mod: CPTII,,, | Performed by: NURSE PRACTITIONER

## 2024-06-29 RX ORDER — ACETAMINOPHEN AND CODEINE PHOSPHATE 300; 30 MG/1; MG/1
TABLET ORAL
Qty: 12 TABLET | Refills: 0 | Status: SHIPPED | OUTPATIENT
Start: 2024-06-29

## 2024-06-29 RX ORDER — PNV NO.95/FERROUS FUM/FOLIC AC 28MG-0.8MG
TABLET ORAL
COMMUNITY

## 2024-06-29 RX ORDER — MULTIVIT-MIN/IRON/FOLIC ACID/K 18-600-40
CAPSULE ORAL
COMMUNITY

## 2024-06-29 RX ORDER — DEXAMETHASONE SODIUM PHOSPHATE 4 MG/ML
6 INJECTION, SOLUTION INTRA-ARTICULAR; INTRALESIONAL; INTRAMUSCULAR; INTRAVENOUS; SOFT TISSUE
Status: COMPLETED | OUTPATIENT
Start: 2024-06-29 | End: 2024-06-29

## 2024-06-29 RX ORDER — DEXAMETHASONE SODIUM PHOSPHATE 4 MG/ML
4 INJECTION, SOLUTION INTRA-ARTICULAR; INTRALESIONAL; INTRAMUSCULAR; INTRAVENOUS; SOFT TISSUE
Status: DISCONTINUED | OUTPATIENT
Start: 2024-06-29 | End: 2024-06-29

## 2024-06-29 RX ADMIN — DEXAMETHASONE SODIUM PHOSPHATE 6 MG: 4 INJECTION, SOLUTION INTRA-ARTICULAR; INTRALESIONAL; INTRAMUSCULAR; INTRAVENOUS; SOFT TISSUE at 02:06

## 2024-06-29 NOTE — PROGRESS NOTES
"Subjective:       Patient ID: Laura Reynoso is a 58 y.o. female.    Chief Complaint: Knee Pain (Presents with Right and left knee pain. Rt knee 08/10 pain.  Left knee 06/10 pain.  Right knee is visibly swollen.  Left knee is slight swollen.  Denies soreness to touch.  Started about 48hrs ago.  Pt denies falling at this time.  )    Presents to clinic as above.  Was seen here May 3rd 2024 and had x-ray of bilateral knees.  X-ray shows tricompartmental disease in both knees.  She was referred to orthopedics  and saw them on May 6, 2024.  She says they offered her intra-articular steroid injection but insurance would not pay for it.  A steroid shot I have given her IM in the clinic in the past was helpful for several days.  She can not take NSAIDs due to allergy.  She desires a steroid shot today.  No new injury      Review of Systems   Constitutional: Negative.    Respiratory: Negative.     Cardiovascular: Negative.    Musculoskeletal:  Positive for joint pain. Negative for falls.          Reviewed family, medical, surgical, and social history.    Objective:      /84 (BP Location: Left arm, Patient Position: Sitting, BP Method: Large (Automatic))   Pulse 61   Temp 98.4 °F (36.9 °C) (Oral)   Resp 20   Ht 5' 3" (1.6 m)   Wt 74.8 kg (165 lb)   SpO2 98%   BMI 29.23 kg/m²   Physical Exam  Vitals and nursing note reviewed.   Constitutional:       General: She is not in acute distress.     Appearance: Normal appearance. She is not ill-appearing, toxic-appearing or diaphoretic.   HENT:      Head: Normocephalic.      Mouth/Throat:      Mouth: Mucous membranes are moist.   Cardiovascular:      Rate and Rhythm: Normal rate and regular rhythm.      Heart sounds: Normal heart sounds.   Pulmonary:      Effort: Pulmonary effort is normal.      Breath sounds: Normal breath sounds.   Musculoskeletal:      Cervical back: Normal range of motion and neck supple.      Right knee: Bony tenderness and crepitus present. No " swelling, deformity, effusion, erythema, ecchymosis or lacerations. Decreased range of motion.      Left knee: Bony tenderness and crepitus present. No swelling, deformity, effusion, erythema, ecchymosis or lacerations. Decreased range of motion.      Comments:  No joint instability.  No numbness or tingling.  Neurovascularly intact.   Skin:     General: Skin is warm and dry.      Capillary Refill: Capillary refill takes less than 2 seconds.   Neurological:      Mental Status: She is alert and oriented to person, place, and time.   Psychiatric:         Mood and Affect: Mood normal.         Behavior: Behavior normal.         Thought Content: Thought content normal.         Judgment: Judgment normal.            No visits with results within 1 Day(s) from this visit.   Latest known visit with results is:   Admission on 06/21/2023, Discharged on 06/21/2023   Component Date Value Ref Range Status    Case Report 06/21/2023    Final                    Value:Surgical Pathology                                Case: [id number]                                   Authorizing Provider:  Rosette Ng MD    Collected:           06/21/2023 02:08 PM          Ordering Location:     [facility] -     Received:            06/21/2023 02:12 PM                                 [facility]                                                              Pathologist:           Jaxson Figueroa MD                                                            Specimen:    Cervix, A. cervix biopsy                                                                   Final Diagnosis 06/21/2023    Final                    Value:This result contains rich text formatting which cannot be displayed here.    Comments 06/21/2023    Final                    Value:This result contains rich text formatting which cannot be displayed here.    Gross Description 06/21/2023    Final                    Value:This result contains rich text formatting which  cannot be displayed here.    Microscopic Description 06/21/2023    Final                    Value:This result contains rich text formatting which cannot be displayed here.    Laboratory Notes 06/21/2023    Final                    Value:This result contains rich text formatting which cannot be displayed here.      Assessment:       1. Pain in both knees, unspecified chronicity        Plan:       Pain in both knees, unspecified chronicity  -     acetaminophen-codeine 300-30mg (TYLENOL #3) 300-30 mg Tab; Take 1-2 po q 6 hours prn knee pain  Dispense: 12 tablet; Refill: 0  -     dexAMETHasone injection 6 mg    Other orders  -     Discontinue: dexAMETHasone injection 4 mg    Follow-up with orthopedics   Return to clinic as needed          Risks, benefits, and side effects were discussed with the patient. All questions were answered to the fullest satisfaction of the patient, and pt verbalized understanding and agreement to treatment plan. Pt was to call with any new or worsening symptoms, or present to the ER.

## 2024-09-25 ENCOUNTER — DOCUMENTATION ONLY (OUTPATIENT)
Dept: REHABILITATION | Facility: HOSPITAL | Age: 59
End: 2024-09-25
Payer: COMMERCIAL

## 2024-09-25 PROBLEM — M62.81 QUADRICEPS WEAKNESS: Status: RESOLVED | Noted: 2024-05-22 | Resolved: 2024-09-25

## 2024-09-25 PROBLEM — G89.29 CHRONIC PAIN OF RIGHT KNEE: Status: RESOLVED | Noted: 2024-05-22 | Resolved: 2024-09-25

## 2024-09-25 PROBLEM — M25.561 CHRONIC PAIN OF RIGHT KNEE: Status: RESOLVED | Noted: 2024-05-22 | Resolved: 2024-09-25

## 2024-09-25 PROBLEM — M25.661 DECREASED ROM OF RIGHT KNEE: Status: RESOLVED | Noted: 2024-05-22 | Resolved: 2024-09-25

## 2024-09-25 PROBLEM — M17.9 OSTEOARTHRITIS OF KNEE: Status: RESOLVED | Noted: 2024-05-03 | Resolved: 2024-09-25

## 2024-09-25 NOTE — PROGRESS NOTES
"  Outpatient Therapy Discharge Summary     Name: Laura Reynoso  Clinic Number: [id number]    Therapy Diagnosis:        Encounter Diagnoses   Name Primary?    Primary osteoarthritis of right knee Yes    Chronic pain of right knee      Quadriceps weakness      Decreased ROM of right knee        Physician: Bernie Church PA    Physician Orders: PT Eval and Treat   Medical Diagnosis from Referral: see above  Evaluation Date: 5/21/2024  Authorization Period Expiration: 8/19/2024  Plan of Care Expiration: 7/12/2024     Date of Surgery: n/a  Visit # / Visits authorized: 8/ 9   FOTO: 1/ 3     Precautions: Standard     Date of Last visit: 6/13/2024  Total Visits Received: 8    Assessment    Pt has audible and palpable crepitus in her R knee. Reports she has nagging pain behind patella @ rest and with motion. She states her pain has not changed since starting therapy. She says it feels better when she has something compressing the knee. She is wearing a volleyball knee pad for "compression" of the knee. She says it makes it feel better. She states the tape did not really help.      Discharge reason: Patient has reached the maximum rehab potential for the present time. She did not attend her last scheduled physical therapy visit but was going to be referred back to MD anyway.     Plan   This patient is discharged from Physical Therapy.    ALYSSIA HOLLOWAY, PT  9/25/2024       "

## (undated) DEVICE — SOL NACL IRR 1000ML BTL

## (undated) DEVICE — GLOVE PROTEXIS PI SYN SURG 7

## (undated) DEVICE — NDL HYPODERMIC SAF 25G 1.5IN

## (undated) DEVICE — SUT CHROMIC 2-0 CT1 36IN BR

## (undated) DEVICE — SUT CHROMIC 2-0 SH 27IN BRN

## (undated) DEVICE — SYR 10CC LUER LOCK

## (undated) DEVICE — GOWN POLY REINF BRTH SLV XL

## (undated) DEVICE — KIT LITHOTOMY RUSH

## (undated) DEVICE — PENCIL ELECTROSURG HOLST W/BLD

## (undated) DEVICE — TIP YANKAUERS BULB NO VENT

## (undated) DEVICE — Device

## (undated) DEVICE — GLOVE 7.5 PROTEXIS PI MICRO

## (undated) DEVICE — BLADE SURG CARBON STEEL SZ11